# Patient Record
Sex: MALE | Race: ASIAN | NOT HISPANIC OR LATINO | Employment: OTHER | URBAN - METROPOLITAN AREA
[De-identification: names, ages, dates, MRNs, and addresses within clinical notes are randomized per-mention and may not be internally consistent; named-entity substitution may affect disease eponyms.]

---

## 2017-08-30 ENCOUNTER — OFFICE VISIT - HEALTHEAST (OUTPATIENT)
Dept: CARDIOLOGY | Facility: CLINIC | Age: 57
End: 2017-08-30

## 2017-08-30 DIAGNOSIS — I25.10 CAD (CORONARY ARTERY DISEASE): ICD-10-CM

## 2017-08-30 DIAGNOSIS — R06.00 DYSPNEA: ICD-10-CM

## 2017-08-30 DIAGNOSIS — R07.9 CHEST PAIN: ICD-10-CM

## 2017-08-30 ASSESSMENT — MIFFLIN-ST. JEOR: SCORE: 1617.32

## 2017-09-13 ENCOUNTER — COMMUNICATION - HEALTHEAST (OUTPATIENT)
Dept: CARDIOLOGY | Facility: CLINIC | Age: 57
End: 2017-09-13

## 2017-09-13 DIAGNOSIS — I25.10 CAD (CORONARY ARTERY DISEASE): ICD-10-CM

## 2017-09-13 DIAGNOSIS — R07.9 CHEST PAIN: ICD-10-CM

## 2017-09-14 ENCOUNTER — SURGERY - HEALTHEAST (OUTPATIENT)
Dept: CARDIOLOGY | Facility: CLINIC | Age: 57
End: 2017-09-14

## 2017-09-18 ENCOUNTER — AMBULATORY - HEALTHEAST (OUTPATIENT)
Dept: CARDIOLOGY | Facility: CLINIC | Age: 57
End: 2017-09-18

## 2017-09-21 ENCOUNTER — SURGERY - HEALTHEAST (OUTPATIENT)
Dept: CARDIOLOGY | Facility: CLINIC | Age: 57
End: 2017-09-21

## 2017-09-21 ASSESSMENT — MIFFLIN-ST. JEOR: SCORE: 1630.02

## 2017-09-22 ASSESSMENT — MIFFLIN-ST. JEOR: SCORE: 1609.16

## 2018-04-23 ENCOUNTER — COMMUNICATION - HEALTHEAST (OUTPATIENT)
Dept: UROLOGY | Facility: CLINIC | Age: 58
End: 2018-04-23

## 2018-04-25 ENCOUNTER — COMMUNICATION - HEALTHEAST (OUTPATIENT)
Dept: UROLOGY | Facility: CLINIC | Age: 58
End: 2018-04-25

## 2020-10-26 ENCOUNTER — OFFICE VISIT - HEALTHEAST (OUTPATIENT)
Dept: CARDIOLOGY | Facility: CLINIC | Age: 60
End: 2020-10-26

## 2020-10-26 DIAGNOSIS — R07.9 CHEST PAIN: ICD-10-CM

## 2020-10-26 DIAGNOSIS — I25.10 CAD (CORONARY ARTERY DISEASE): ICD-10-CM

## 2020-10-26 DIAGNOSIS — R06.00 DYSPNEA: ICD-10-CM

## 2020-10-26 RX ORDER — CARVEDILOL 6.25 MG/1
6.25 TABLET ORAL 2 TIMES DAILY WITH MEALS
Qty: 180 TABLET | Refills: 5 | Status: SHIPPED | OUTPATIENT
Start: 2020-10-26 | End: 2021-11-15

## 2020-10-26 ASSESSMENT — MIFFLIN-ST. JEOR: SCORE: 1612.79

## 2020-10-27 ENCOUNTER — RECORDS - HEALTHEAST (OUTPATIENT)
Dept: ADMINISTRATIVE | Facility: OTHER | Age: 60
End: 2020-10-27

## 2020-10-27 ENCOUNTER — AMBULATORY - HEALTHEAST (OUTPATIENT)
Dept: CARDIOLOGY | Facility: CLINIC | Age: 60
End: 2020-10-27

## 2020-10-27 ENCOUNTER — COMMUNICATION - HEALTHEAST (OUTPATIENT)
Dept: CARDIOLOGY | Facility: CLINIC | Age: 60
End: 2020-10-27

## 2020-11-03 ENCOUNTER — HOSPITAL ENCOUNTER (OUTPATIENT)
Dept: CARDIOLOGY | Facility: HOSPITAL | Age: 60
Discharge: HOME | End: 2020-11-03
Attending: INTERNAL MEDICINE

## 2020-11-03 DIAGNOSIS — R07.9 CHEST PAIN: ICD-10-CM

## 2020-11-03 DIAGNOSIS — I25.10 CAD (CORONARY ARTERY DISEASE): ICD-10-CM

## 2020-11-03 LAB
CV STRESS CURRENT BP HE: NORMAL
CV STRESS CURRENT HR HE: 104
CV STRESS CURRENT HR HE: 107
CV STRESS CURRENT HR HE: 113
CV STRESS CURRENT HR HE: 119
CV STRESS CURRENT HR HE: 122
CV STRESS CURRENT HR HE: 123
CV STRESS CURRENT HR HE: 123
CV STRESS CURRENT HR HE: 129
CV STRESS CURRENT HR HE: 130
CV STRESS CURRENT HR HE: 133
CV STRESS CURRENT HR HE: 133
CV STRESS CURRENT HR HE: 135
CV STRESS CURRENT HR HE: 138
CV STRESS CURRENT HR HE: 138
CV STRESS CURRENT HR HE: 143
CV STRESS CURRENT HR HE: 143
CV STRESS CURRENT HR HE: 72
CV STRESS CURRENT HR HE: 73
CV STRESS CURRENT HR HE: 77
CV STRESS CURRENT HR HE: 85
CV STRESS CURRENT HR HE: 90
CV STRESS CURRENT HR HE: 93
CV STRESS CURRENT HR HE: 93
CV STRESS CURRENT HR HE: 94
CV STRESS CURRENT HR HE: 95
CV STRESS CURRENT HR HE: 95
CV STRESS CURRENT HR HE: 97
CV STRESS DEVIATION TIME HE: NORMAL
CV STRESS ECHO PERCENT HR HE: NORMAL
CV STRESS EXERCISE STAGE HE: NORMAL
CV STRESS FINAL RESTING BP HE: NORMAL
CV STRESS FINAL RESTING HR HE: 95
CV STRESS MAX HR HE: 144
CV STRESS MAX TREADMILL GRADE HE: 16
CV STRESS MAX TREADMILL SPEED HE: 4.2
CV STRESS PEAK DIA BP HE: NORMAL
CV STRESS PEAK SYS BP HE: NORMAL
CV STRESS PHASE HE: NORMAL
CV STRESS PROTOCOL HE: NORMAL
CV STRESS RESTING PT POSITION HE: NORMAL
CV STRESS ST DEVIATION AMOUNT HE: NORMAL
CV STRESS ST DEVIATION ELEVATION HE: NORMAL
CV STRESS ST EVELATION AMOUNT HE: NORMAL
CV STRESS TEST TYPE HE: NORMAL
CV STRESS TOTAL STAGE TIME MIN 1 HE: NORMAL
RATE PRESSURE PRODUCT: NORMAL
STRESS ECHO BASELINE DIASTOLIC HE: 96
STRESS ECHO BASELINE HR: 72
STRESS ECHO BASELINE SYSTOLIC BP: 150
STRESS ECHO CALCULATED PERCENT HR: 89 %
STRESS ECHO LAST STRESS DIASTOLIC BP: 80
STRESS ECHO LAST STRESS HR: 143
STRESS ECHO LAST STRESS SYSTOLIC BP: 168
STRESS ECHO POST ESTIMATED WORKLOAD: 11.7
STRESS ECHO POST EXERCISE DUR MIN: 10
STRESS ECHO POST EXERCISE DUR SEC: 0
STRESS ECHO TARGET HR: 162

## 2020-11-08 ENCOUNTER — HOSPITAL ENCOUNTER (EMERGENCY)
Facility: CLINIC | Age: 60
Discharge: HOME | End: 2020-11-08
Attending: PHYSICIAN ASSISTANT | Admitting: PHYSICIAN ASSISTANT
Payer: COMMERCIAL

## 2020-11-08 VITALS
RESPIRATION RATE: 18 BRPM | WEIGHT: 192 LBS | SYSTOLIC BLOOD PRESSURE: 148 MMHG | OXYGEN SATURATION: 97 % | DIASTOLIC BLOOD PRESSURE: 92 MMHG | TEMPERATURE: 97.9 F | HEART RATE: 77 BPM

## 2020-11-08 DIAGNOSIS — S61.412A LACERATION OF LEFT HAND WITHOUT FOREIGN BODY, INITIAL ENCOUNTER: ICD-10-CM

## 2020-11-08 PROCEDURE — 99283 EMERGENCY DEPT VISIT LOW MDM: CPT | Performed by: PHYSICIAN ASSISTANT

## 2020-11-08 PROCEDURE — 12002 RPR S/N/AX/GEN/TRNK2.6-7.5CM: CPT | Performed by: PHYSICIAN ASSISTANT

## 2020-11-08 PROCEDURE — 99282 EMERGENCY DEPT VISIT SF MDM: CPT | Mod: 25 | Performed by: PHYSICIAN ASSISTANT

## 2020-11-08 ASSESSMENT — ENCOUNTER SYMPTOMS
WOUND: 1
COLOR CHANGE: 0
FEVER: 0
NUMBNESS: 0
WEAKNESS: 0
MUSCULOSKELETAL NEGATIVE: 1
CONSTITUTIONAL NEGATIVE: 1

## 2020-11-08 NOTE — ED NOTES
Pt was cutting venison with a newly sharpened knife across right lower palm of hand with approximate 5cm laceration.  Provider in room with pt.   Pt reports UTD with tetanus

## 2020-11-08 NOTE — ED AVS SNAPSHOT
[facility] Emergency Dept  [address] [address]  Phone: [phone]  Fax: [phone]                                    Rj Pink   MRN: [id number]    Department: [facility] Emergency Dept   Date of Visit: 11/8/2020           After Visit Summary Signature Page    I have received my discharge instructions, and my questions have been answered. I have discussed any challenges I see with this plan with the nurse or doctor.    ..........................................................................................................................................  Patient/Patient Representative Signature      ..........................................................................................................................................  Patient Representative Print Name and Relationship to Patient    ..................................................               ................................................  Date                                   Time    ..........................................................................................................................................  Reviewed by Signature/Title    ...................................................              ..............................................  Date                                               Time          22EPIC Rev 08/18

## 2020-11-09 NOTE — DISCHARGE INSTRUCTIONS
After care instructions:  Keep wound clean and dry for the next 24-48 hours  Sutures out in 7-9 days  Signs of infection discussed today  Apply anti-bacterial ointment for 3 days  May return to work as long as wound is kept clean and dry  Discussed the probability of scarring  9 sutures place       Return to clinic sooner or go to Emergency Room if symptoms worsen or change or signs of infection occur (redness, red streaking, warmth, increased pain, increased swelling, purulent drainage, or fevers occur.)    May use acetaminophen, ibuprofen as needed    Patient voiced understanding of instructions given.

## 2020-11-09 NOTE — ED PROVIDER NOTES
History     Chief Complaint   Patient presents with     Laceration     laceration to left hand with knife.~3' lac bldg controlled with pressure     HPI    Rj Pink is a 60 year old male who presents to the clinic with a laceration on the left hand sustained 2 hours ago.  This is a non-work related and accidental injury.  Mechanism of injury: knife. Patient states he was cutting meat for a meal and cut the hand with the tip of the knife.     Associated symptoms: Denies numbness, weakness, or loss of function  Last tetanus booster within 5 years: yes; 2017    Patient takes a baby aspirin daily    Problem list, Medication list, Allergies, and Medical/Social/Surgical histories reviewed in [employer] and updated as appropriate.    Allergies:  No Known Allergies    Problem List:    There are no active problems to display for this patient.       Past Medical History:    No past medical history on file.    Past Surgical History:    No past surgical history on file.    Family History:    No family history on file.    Social History:  Marital Status:   [2]  Social History     Tobacco Use     Smoking status: Not on file   Substance Use Topics     Alcohol use: Not on file     Drug use: Not on file        Medications:    No current outpatient medications on file.        Review of Systems   Constitutional: Negative.  Negative for fever.   Musculoskeletal: Negative.    Skin: Positive for wound (laceration to the left hand over the thenar eminence. ). Negative for color change.   Neurological: Negative for weakness and numbness.   All other systems reviewed and are negative.      Physical Exam   BP: (!) 148/92  Pulse: 77  Temp: 97.9  F (36.6  C)  Resp: 18  Weight: 87.1 kg (192 lb)  SpO2: 97 %      Physical Exam  Vitals signs and nursing note reviewed.   Constitutional:       General: He is not in acute distress.     Appearance: Normal appearance. He is normal weight. He is not ill-appearing or toxic-appearing.    Cardiovascular:      Pulses: Normal pulses.   Musculoskeletal:      Left hand: He exhibits tenderness and laceration. He exhibits normal range of motion, no bony tenderness, normal two-point discrimination, normal capillary refill and no swelling. Normal sensation noted. Normal strength noted.        Hands:       Comments: Patient has full range of motion in the hand with and without resistance in all fingers.  Patient neurovascularly intact with no concerns for vascular injury, tendon injury or bony involvement.   Skin:     General: Skin is warm.      Capillary Refill: Capillary refill takes less than 2 seconds.      Findings: Laceration (4 cm superifical laceration to left hand across thenar eminence. ) present.   Neurological:      General: No focal deficit present.      Mental Status: He is alert and oriented to person, place, and time.   Psychiatric:         Mood and Affect: Mood normal.         Behavior: Behavior normal.         Thought Content: Thought content normal.         Judgment: Judgment normal.         ED Course        [facility]     -Laceration Repair    Date/Time: 11/8/2020 7:48 PM  Performed by: Barbara Roche PA-C  Authorized by: Barbara Roche PA-C       ANESTHESIA (see MAR for exact dosages):     Anesthesia method:  Local infiltration    Local anesthetic:  Bupivacaine 0.25% w/o epi  LACERATION DETAILS     Location:  Hand    Hand location:  L palm    Length (cm):  4    Depth (mm):  2    REPAIR TYPE:     Repair type:  Simple      EXPLORATION:     Wound exploration: wound explored through full range of motion and entire depth of wound probed and visualized      Wound extent: no foreign body, no nerve damage, no tendon damage, no underlying fracture and no vascular damage      Contaminated: no      TREATMENT:     Area cleansed with:  Hibiclens and saline    Amount of cleaning:  Extensive    Irrigation method:  Syringe    SKIN REPAIR     Repair method:   Sutures    Suture size:  5-0    Suture material:  Nylon    Number of sutures:  9    APPROXIMATION     Approximation:  Close    POST-PROCEDURE DETAILS     Dressing:  Antibiotic ointment, sterile dressing and bulky dressing      PROCEDURE   Patient Tolerance:  Patient tolerated the procedure well with no immediate complications                    Critical Care time:  none               No results found for this or any previous visit (from the past 24 hour(s)).    Medications - No data to display    Assessments & Plan (with Medical Decision Making)     I have reviewed the nursing notes.    I have reviewed the findings, diagnosis, plan and need for follow up with the patient.   60-year-old male presents today with laceration to the left hand that occurred while using a kitchen knife.  Patient's last tetanus was in 2017 and is up-to-date.  Patient denies any numbness, tingling, pain in proportion or foreign body.  See exam findings above.  9 sutures placed in the 4 cm superficial laceration to the left thenar eminence.  Patient has full range of motion in the hand with and without resistance and neurovascularly intact.  Bleeding well controlled and stop with suture placements.  No vascular, tendon or bony involvement noted.  Patient's wound was bandaged and patient informed to have sutures removed in the next 7 to 9 days.  Patient return sooner if signs or symptoms of infection occur.  This was discussed with patient and given on discharge paperwork.  Patient discharged in stable condition with no indication for antibiotics at this point.  Patient keep wound clean and covered.  Bacitracin for 3 days and then dry bandage.    There are no discharge medications for this patient.      Final diagnoses:   Laceration of left hand without foreign body, initial encounter       11/8/2020   [facility] EMERGENCY DEPT     Barbara Roche PA-C  11/08/20 [id number]

## 2021-03-10 ENCOUNTER — COMMUNICATION - HEALTHEAST (OUTPATIENT)
Dept: CARDIOLOGY | Facility: CLINIC | Age: 61
End: 2021-03-10

## 2021-05-27 ENCOUNTER — RECORDS - HEALTHEAST (OUTPATIENT)
Dept: ADMINISTRATIVE | Facility: CLINIC | Age: 61
End: 2021-05-27

## 2021-05-31 VITALS — WEIGHT: 191.2 LBS | HEIGHT: 65 IN | BODY MASS INDEX: 31.86 KG/M2

## 2021-05-31 VITALS — WEIGHT: 193 LBS | HEIGHT: 65 IN | BODY MASS INDEX: 32.15 KG/M2

## 2021-06-05 ENCOUNTER — RECORDS - HEALTHEAST (OUTPATIENT)
Dept: CARDIOLOGY | Facility: CLINIC | Age: 61
End: 2021-06-05

## 2021-06-05 VITALS
WEIGHT: 192 LBS | BODY MASS INDEX: 31.99 KG/M2 | DIASTOLIC BLOOD PRESSURE: 80 MMHG | RESPIRATION RATE: 14 BRPM | HEART RATE: 80 BPM | HEIGHT: 65 IN | SYSTOLIC BLOOD PRESSURE: 130 MMHG

## 2021-06-05 DIAGNOSIS — I25.10 CORONARY ATHEROSCLEROSIS: ICD-10-CM

## 2021-06-12 NOTE — PATIENT INSTRUCTIONS - HE
Please obtain blood test results from [place] and have copies sent to us    Schedule stress echo    Cont clopidogrel and asp 81mg until results back on stress echo - if normal, then stop clopidogrel and raise aspirin to 162mg daily.      Stop metoprolol and start carvedilol - watch for rash, shortness of breath

## 2021-06-12 NOTE — PROGRESS NOTES
[facility] Heart Care Note    Assessment/Plan:    Rj Pink is a 57 y.o. old male with:  1. Chest pain/dyspnea - features atypical for angina for chest pain but dyspnea could be due to CAD - although lower likelyhood given stress echo  - plan to lower asp 81mg daily and change metoprolol tartrate to succinate at night 50mg, he will discuss with his family (son is physician) and if he would want to pursue angiography would go ahead with favoring FFR prior to PCI given features noted above.  Cont plavix/atorvastatin/losartan        Dr. Willard Ramirez  [facility] HEART CARE  [phone]  ______________________________________________________________________    Subjective:    I had the opportunity to see Rj Pink at the [facility]. Rj Pink is a 57 y.o. male with a known history of CAD s/p MI and then PCI to LAD year later in 2015 returns with chest pain prompting stress echo - no ischemia ECG/echo after 10 min, noted EF 50% with no WMA>  Chest pain not with exertion or with stress test, but intermittently presnt, noted residual mod dz inCirc territory.  Pt does report dyspnea presnt on walking up stairs more than in the past - he is IDDM and we discussed risks, benefits, goals and alternatives to revasularization in setting of symptoms or travel or if he were to require high risk surgery.    ______________________________________________________________________      Review of Systems:   General: Weight Gain  Eyes: WNL  Ears/Nose/Throat: WNL  Lungs: Cough, Wheezing  Heart: Chest Pain, Shortness of Breath with activity  Stomach: Heartburn  Bladder: WNL  Muscle/Joints: Muscle Weakness  Skin: WNL  Nervous System: WNL  Mental Health: WNL     Blood: WNL    Problem List:  Patient Active Problem List   Diagnosis     Obesity     Epicondylitis     Male Erectile Disorder     Mixed hyperlipidemia     Joint Pain, Localized In The Shoulder     Diabetes mellitus type II, controlled     Essential  "hypertension     Non-STEMI (non-ST elevated myocardial infarction)     Coronary artery disease     Chest pain     Medical History:  Past Medical History:   Diagnosis Date     Chest pain      Coronary artery disease 11/13/2015     Diabetes mellitus, type II      Hypertension      Kidney stone      Myocardial infarction     NSTEMI 2014     Surgical History:  Past Surgical History:   Procedure Laterality Date     CARDIAC CATHETERIZATION  10/27/15     CORONARY STENT PLACEMENT  2014     kidney stone removal       Social History:  No pertinent social hx related to patient's chief complaint other than above in subjective        Family History:  No pertinent family hx related to patient's chief complaint other than above in subjective      Allergies:  Allergies   Allergen Reactions     Lisinopril Cough       Medications:  Current Outpatient Prescriptions   Medication Sig Dispense Refill     aspirin 325 MG EC tablet Take 325 mg by mouth daily.       atorvastatin (LIPITOR) 80 MG tablet Take 80 mg by mouth every evening.        clopidogrel (PLAVIX) 75 mg tablet Take 75 mg by mouth daily.       insulin glargine (LANTUS) 100 unit/mL injection Inject 40 Units under the skin at bedtime.        losartan (COZAAR) 50 MG tablet Take 50 mg by mouth once daily.        metFORMIN (GLUCOPHAGE-XR) 500 MG 24 hr tablet Take 1,000 mg by mouth 2 (two) times a day with meals.       metoprolol tartrate (LOPRESSOR) 50 MG tablet Take 50 mg by mouth daily.       nitroglycerin (NITROSTAT) 0.4 MG SL tablet Place 1 tablet (0.4 mg total) under the tongue every 5 (five) minutes as needed. Up to 3 doses for chest pain 90 tablet 11     No current facility-administered medications for this visit.        Objective:   Vital signs:  /78 (Patient Site: Left Arm, Patient Position: Sitting, Cuff Size: Adult Large)  Pulse 76  Resp 18  Ht 5' 5\" (1.651 m)  Wt 193 lb (87.5 kg)  BMI 32.12 kg/m2      Physical Exam:    GENERAL APPEARANCE: Alert, cooperative " and in no acute distress.  HEENT: No scleral icterus. No Xanthelasma. Oral mucuos membranes pink and moist.  NECK: JVP<6cm. No Hepatojugular reflux. Thyroid not visualized  CHEST: clear to auscultation  CARDIOVASCULAR: S1, S2 without murmur ,clicks or rubs. Brachial, radial and posterior tibial pulses are intact and symetric. No carotid bruits noted.    ABDOMEN: Nontender. BS+. No bruits.  EXTREMITIES: No cyanosis, clubbing or edema.    Lab Results:  LIPIDS:  Lab Results   Component Value Date    CHOL 161 07/04/2014    CHOL 170 08/31/2012    CHOL 176 04/25/2011     Lab Results   Component Value Date    HDL 27 (L) 07/04/2014    HDL 23 (L) 08/31/2012    HDL 25 (L) 04/25/2011     Lab Results   Component Value Date    LDLCALC 78 07/04/2014    LDLCALC   Invalid, Triglyceride >300 08/31/2012    LDLCALC   Invalid, Triglyceride >300 04/25/2011     Lab Results   Component Value Date    TRIG 279 (H) 07/04/2014    TRIG 637 (H) 08/31/2012    TRIG 331 (H) 04/25/2011     No components found for: CHOLHDL    BMP:  Lab Results   Component Value Date    CREATININE 1.02 06/30/2017    BUN 17 06/30/2017     06/30/2017    K 4.0 06/30/2017     (H) 06/30/2017    CO2 22 06/30/2017         Willard Ramirez MD  [facility]  [phone]

## 2021-06-12 NOTE — TELEPHONE ENCOUNTER
----- Message from Willard Ramirez MD sent at 10/27/2020  1:01 PM CDT -----  Lipids at target cont with atorvastatin        Called patient and reviewed response from Dr. Ramirez. Patient verbalized understanding and agreement. No further questions concerns. -ejb

## 2021-06-13 NOTE — PROGRESS NOTES
Rj Alegriag  [address] [facility] [address]  [phone] (home)     Primary cardiologist: Dr. Ramirez  Procedure:  Coronary Angiogram Possible PCI  H&P completed by:  Dr. Ashley Almodovar MD: Dr. Gaspar  Admit date and time:  9/21 8 am  Case start time:  10 am  Ordering MD:  Dr. Ramirez  Diagnosis:  Chest pain  Anticoagulation: None  CPAP: No  Bypass Grafts: No  Renal Issues: No  Allergies:   Allergies   Allergen Reactions     Lisinopril Cough     Diabetic?: Yes  Device?: No      Angiogram Teaching    Reason for Visit:  Telephone call to discuss pre-procedure education in preparation for: Coronary Angiogram Possible PCI    Procedure Prep:  Cardiologist note dated: 8/30  EKG results obtained, dated: am of procedure  Hemogram results obtained: am of procedure  Basic Metabolic Panel results obtained: am of procedure  Lipid Profile results obtained: am of procedure    Patient Education  Explained indications/risks for diagnostic evaluation, including one or more of the following:  coronary angiogram, less than 0.1% of acute myocardial infarction and CVA or death or any of the following to the degree that it could threaten life:  allergic reaction, arrhythmia, renal failure, hemorrhage, thrombosis and infection  Explained indications/risks for therapeutic interventions, including one or more of the following: PTCA, artherectomy, stent, 2% or less risk of MI, less than 1% risk of CVA, emergency heart surgery, death, less than 4 % risk of vascular injury requiring surgical repair or blood transfusion, 20-30% risk of restonosis with a bare metal stent, less than 10% risk of restonosis with a drug-eluting stent, 0.5-1% chance of stent thrombosis and may need clopidogrel (Plavix) form greater than 1 year.  These risks are in addition to baseline risks associated with a Diagnostic Evaluation.  Patient states understanding of procedure and risks and agrees to proceed.    Pre-procedure instructions  Patient instructed to  be NPO after midnight.  Patient instructed to arrange for transportation home following procedure.  Patient instructed to have a responsible adult with them for 24 hours post-procedure.  Post-procedure follow up process.  Conscious sedation discussed.    Pre-procedure medication instructions  Patient instructed on antiplatelet medication.  Continue medications as scheduled, with a small amount of water on the day of the procedure unless indicated.  Patient instructed to take 325 mg of Aspirin am of procedure: No  Other medication: instructed to hold Metformin a.m. of the procedure. Instructed to hold Metformin 48 hours after procedure  Pt takes Lantus at HS (no am dose)  *Order set was entered on this date: 9/14      Patient Active Problem List   Diagnosis     Obesity     Epicondylitis     Male Erectile Disorder     Mixed hyperlipidemia     Joint Pain, Localized In The Shoulder     Diabetes mellitus type II, controlled     Essential hypertension     Non-STEMI (non-ST elevated myocardial infarction)     Coronary artery disease     Chest pain     Chest pain     CAD (coronary artery disease)       Current Outpatient Prescriptions   Medication Sig Dispense Refill     aspirin 81 MG EC tablet Take 1 tablet (81 mg total) by mouth daily.  0     atorvastatin (LIPITOR) 80 MG tablet Take 80 mg by mouth every evening.        clopidogrel (PLAVIX) 75 mg tablet Take 75 mg by mouth daily.       insulin glargine (LANTUS) 100 unit/mL injection Inject 40 Units under the skin at bedtime.        losartan (COZAAR) 50 MG tablet Take 50 mg by mouth once daily.        metFORMIN (GLUCOPHAGE-XR) 500 MG 24 hr tablet Take 1,000 mg by mouth 2 (two) times a day with meals.       metoprolol succinate (TOPROL XL) 50 MG 24 hr tablet Take 1 tablet (50 mg total) by mouth daily. 90 tablet 5     nitroglycerin (NITROSTAT) 0.4 MG SL tablet Place 1 tablet (0.4 mg total) under the tongue every 5 (five) minutes as needed. Up to 3 doses for chest pain 90  tablet 11     No current facility-administered medications for this visit.        Allergies   Allergen Reactions     Lisinopril Cough       Patient verbalized understanding of the above teaching.    Kip Rowe RN, BSN, PHN  Cardiovascular Nurse Clinician  [facility]  Direct: [phone]  [phone]  Email: [email or web address]

## 2021-06-15 NOTE — TELEPHONE ENCOUNTER
----- Message from Dodie Castillo sent at 3/10/2021 10:42 AM CST -----  General phone call:  PATIENT WONDERING IF HE CAN GET A VACCINE DUE TO HIS MEDICAL CONDITION?  PLEASE CALL    Caller: PATIENT  Primary cardiologist: DR TEJEDA  Detailed reason for call: SEE ABOVE  New or active symptoms? NO  Best phone number: [phone]  Best time to contact: ANY TIME  Ok to leave a detailed message? YES  Device? NO    Additional Info:

## 2021-06-15 NOTE — TELEPHONE ENCOUNTER
Informed pt he may get the vaccine.  Pt verbalized understanding and had no further questions.  -jamir

## 2021-06-16 PROBLEM — T88.59XA NAUSEA AFTER ANESTHESIA: Status: ACTIVE | Noted: 2017-09-21

## 2021-06-16 PROBLEM — I20.9 ANGINAL PAIN (H): Status: ACTIVE | Noted: 2017-06-29

## 2021-06-16 PROBLEM — I25.10 CORONARY ARTERY DISEASE INVOLVING NATIVE CORONARY ARTERY OF NATIVE HEART, ANGINA PRESENCE UNSPECIFIED: Status: ACTIVE | Noted: 2017-09-13

## 2021-06-16 PROBLEM — R11.0 NAUSEA AFTER ANESTHESIA: Status: ACTIVE | Noted: 2017-09-21

## 2021-06-16 PROBLEM — R07.9 CHEST PAIN: Status: ACTIVE | Noted: 2017-09-13

## 2021-06-29 NOTE — PROGRESS NOTES
Progress Notes by Willard Ramirez MD at 10/26/2020  7:50 AM     Author: Willard Ramirez MD Service: -- Author Type: Physician    Filed: 10/26/2020  8:29 AM Encounter Date: 10/26/2020 Status: Signed    : Willard Ramirez MD (Physician)         Thank you, Dr. Gong, for asking the [facility] team to see Mr. Rj Pink to evaluate       Assessment/Recommendations   Assessment/Plan:  1. CAD s/p PCI - on atorvastatin 80mg, will plan stopping clopidogrel and raise asp 162mg daily  IF stress echo is normal given symptoms of chest pressure.  Change metoprolol to carvedilol given DM and possible spasm  2. HTN - on losartan, hydrochlorothiazide and now carvedilol  3. Lipids - have tests done by primary care sent to us for review    Follow up in 1 year         History of Present Illness/Subjective    Mr. Rj Pink is a 60 y.o. male with hx for DM, CKD, CAD s/p PCI to LAD in 2015, last angiography 2017 with mod dz in OM by [facility], describes two types of pain - one on chest that occurs randomly not with exertion and a pain in the right upper back/shoulder blade also random not with movement/exertion, but also dyspnea when walk up several flights of stairs. No tob, he has DM with last HgbA1c 7.7 with [facility].  No PND, orthopnea, no ALYSSA, no GI/ bleeding, tolerating atorvastatin 80mg.   He traveled to [place] in 2017 for several weeks.   He took NTG 2 weeks ago from dull pain spontaneously, relief after 2-3 minutes.         Physical Examination Review of Systems   Vitals:    10/26/20 0759   BP: 130/80   Pulse: 80   Resp: 14     Body mass index is 31.95 kg/m .  Wt Readings from Last 3 Encounters:   10/26/20 192 lb (87.1 kg)   04/22/18 190 lb (86.2 kg)   09/22/17 191 lb 3.2 oz (86.7 kg)     [unfilled]  General Appearance:   no distress, normal body habitus   ENT/Mouth: membranes moist, no oral lesions or bleeding gums.      EYES:  no scleral icterus, normal  conjunctivae   Neck: no carotid bruits or thyromegaly   Chest/Lungs:   lungs are clear to auscultation, no rales or wheezing,  sternal scar, equal chest wall expansion    Cardiovascular:   Regular. Normal first and second heart sounds with no murmurs, rubs, or gallops; the carotid, radial and posterior tibial pulses are intact, Jugular venous pressure , edema bilaterally    Abdomen:  no organomegaly, masses, bruits, or tenderness; bowel sounds are present   Extremities: no cyanosis or clubbing   Skin: no xanthelasma, warm.    Neurologic: normal  bilateral, no tremors     Psychiatric: alert and oriented x3, calm     Review of Systems - 12 points nega other than above      Medical History  Surgical History Family History Social History   Past Medical History:   Diagnosis Date   ? Chest pain    ? Coronary artery disease 11/13/2015   ? Diabetes mellitus, type II (H)    ? Hypertension    ? Kidney stone    ? Myocardial infarction (H)     NSTEMI 2014    Past Surgical History:   Procedure Laterality Date   ? CARDIAC CATHETERIZATION  10/27/15   ? CORONARY STENT PLACEMENT  2014   ? CV CORONARY ANGIOGRAM N/A 9/21/2017    Procedure: Coronary Angiogram;  Surgeon: Claribel Gaspar MD;  Location: [facility];  Service:    ? CV LEFT HEART CATHETERIZATION WITH LEFT VENTRICULOGRAM N/A 9/21/2017    Procedure: Left Heart Catheterization with Left Ventriculogram;  Surgeon: Claribel Gaspar MD;  Location: [facility];  Service:    ? kidney stone removal      Family History   Problem Relation Age of Onset   ? Stroke Brother 46   ? Heart attack Neg Hx     Social History     Socioeconomic History   ? Marital status:      Spouse name: Not on file   ? Number of children: Not on file   ? Years of education: Not on file   ? Highest education level: Not on file   Occupational History   ? Not on file   Social Needs   ? Financial resource strain: Not on file   ? Food insecurity     Worry: Not on file     Inability: Not  on file   ? Transportation needs     Medical: Not on file     Non-medical: Not on file   Tobacco Use   ? Smoking status: Never Smoker   ? Smokeless tobacco: Never Used   Substance and Sexual Activity   ? Alcohol use: Yes     Comment: once in a few months   ? Drug use: No   ? Sexual activity: Not on file   Lifestyle   ? Physical activity     Days per week: Not on file     Minutes per session: Not on file   ? Stress: Not on file   Relationships   ? Social connections     Talks on phone: Not on file     Gets together: Not on file     Attends Jainism service: Not on file     Active member of club or organization: Not on file     Attends meetings of clubs or organizations: Not on file     Relationship status: Not on file   ? Intimate partner violence     Fear of current or ex partner: Not on file     Emotionally abused: Not on file     Physically abused: Not on file     Forced sexual activity: Not on file   Other Topics Concern   ? Not on file   Social History Narrative   ? Not on file          Medications  Allergies   Scheduled Meds:  Continuous Infusions:  PRN Meds:. Allergies   Allergen Reactions   ? Lisinopril Cough         Lab Results    Chemistry/lipid CBC Cardiac Enzymes/BNP/TSH/INR   Lab Results   Component Value Date    CHOL 113 09/21/2017    HDL 23 (L) 09/21/2017    LDLCALC 36 09/21/2017    TRIG 271 (H) 09/21/2017    CREATININE 1.30 04/22/2018    BUN 23 (H) 04/22/2018    K 3.7 04/22/2018     04/22/2018     04/22/2018    CO2 28 04/22/2018    Lab Results   Component Value Date    WBC 9.9 04/22/2018    HGB 14.1 04/22/2018    HCT 41.1 04/22/2018    MCV 85 04/22/2018     04/22/2018    Lab Results   Component Value Date    TROPONINI <0.01 06/29/2017    TSH 1.7 12/07/2012    INR 1.02 06/29/2017              Willard Ramirez MD  Interventional Cardiology  [facility]

## 2022-02-06 DIAGNOSIS — I25.10 CAD (CORONARY ARTERY DISEASE): ICD-10-CM

## 2022-02-06 DIAGNOSIS — R07.9 CHEST PAIN: ICD-10-CM

## 2022-02-07 RX ORDER — CARVEDILOL 6.25 MG/1
TABLET ORAL
Qty: 180 TABLET | Refills: 0 | Status: SHIPPED | OUTPATIENT
Start: 2022-02-07 | End: 2022-07-28

## 2022-07-28 ENCOUNTER — OFFICE VISIT (OUTPATIENT)
Dept: CARDIOLOGY | Facility: CLINIC | Age: 62
End: 2022-07-28
Payer: COMMERCIAL

## 2022-07-28 VITALS
BODY MASS INDEX: 30.95 KG/M2 | DIASTOLIC BLOOD PRESSURE: 84 MMHG | SYSTOLIC BLOOD PRESSURE: 120 MMHG | HEART RATE: 102 BPM | WEIGHT: 186 LBS | RESPIRATION RATE: 16 BRPM

## 2022-07-28 DIAGNOSIS — E78.5 HYPERLIPIDEMIA LDL GOAL <70: ICD-10-CM

## 2022-07-28 DIAGNOSIS — I15.9 SECONDARY HYPERTENSION: ICD-10-CM

## 2022-07-28 DIAGNOSIS — R07.9 CHEST PAIN, UNSPECIFIED TYPE: Primary | ICD-10-CM

## 2022-07-28 LAB
ALBUMIN SERPL-MCNC: 4 G/DL (ref 3.5–5)
ALP SERPL-CCNC: 69 U/L (ref 45–120)
ALT SERPL W P-5'-P-CCNC: 19 U/L (ref 0–45)
ANION GAP SERPL CALCULATED.3IONS-SCNC: 8 MMOL/L (ref 5–18)
AST SERPL W P-5'-P-CCNC: 12 U/L (ref 0–40)
BILIRUB SERPL-MCNC: 0.7 MG/DL (ref 0–1)
BUN SERPL-MCNC: 25 MG/DL (ref 8–22)
CALCIUM SERPL-MCNC: 10.1 MG/DL (ref 8.5–10.5)
CHLORIDE BLD-SCNC: 106 MMOL/L (ref 98–107)
CHOLEST SERPL-MCNC: 104 MG/DL
CO2 SERPL-SCNC: 26 MMOL/L (ref 22–31)
CREAT SERPL-MCNC: 1.33 MG/DL (ref 0.7–1.3)
D DIMER PPP FEU-MCNC: <0.27 UG/ML FEU (ref 0–0.5)
ERYTHROCYTE [DISTWIDTH] IN BLOOD BY AUTOMATED COUNT: 13.1 % (ref 10–15)
FASTING STATUS PATIENT QL REPORTED: YES
GFR SERPL CREATININE-BSD FRML MDRD: 61 ML/MIN/1.73M2
GLUCOSE BLD-MCNC: 83 MG/DL (ref 70–125)
HCT VFR BLD AUTO: 48.1 % (ref 40–53)
HDLC SERPL-MCNC: 24 MG/DL
HGB BLD-MCNC: 15.3 G/DL (ref 13.3–17.7)
LDLC SERPL CALC-MCNC: 47 MG/DL
MCH RBC QN AUTO: 28 PG (ref 26.5–33)
MCHC RBC AUTO-ENTMCNC: 31.8 G/DL (ref 31.5–36.5)
MCV RBC AUTO: 88 FL (ref 78–100)
PLATELET # BLD AUTO: 190 10E3/UL (ref 150–450)
POTASSIUM BLD-SCNC: 4.8 MMOL/L (ref 3.5–5)
PROT SERPL-MCNC: 7.9 G/DL (ref 6–8)
RBC # BLD AUTO: 5.47 10E6/UL (ref 4.4–5.9)
SODIUM SERPL-SCNC: 140 MMOL/L (ref 136–145)
TRIGL SERPL-MCNC: 166 MG/DL
TROPONIN I SERPL-MCNC: <0.01 NG/ML (ref 0–0.29)
WBC # BLD AUTO: 8.6 10E3/UL (ref 4–11)

## 2022-07-28 PROCEDURE — 85379 FIBRIN DEGRADATION QUANT: CPT | Performed by: INTERNAL MEDICINE

## 2022-07-28 PROCEDURE — 80061 LIPID PANEL: CPT | Performed by: INTERNAL MEDICINE

## 2022-07-28 PROCEDURE — 36415 COLL VENOUS BLD VENIPUNCTURE: CPT | Performed by: INTERNAL MEDICINE

## 2022-07-28 PROCEDURE — 93000 ELECTROCARDIOGRAM COMPLETE: CPT | Performed by: INTERNAL MEDICINE

## 2022-07-28 PROCEDURE — 80053 COMPREHEN METABOLIC PANEL: CPT | Performed by: INTERNAL MEDICINE

## 2022-07-28 PROCEDURE — 84484 ASSAY OF TROPONIN QUANT: CPT | Performed by: INTERNAL MEDICINE

## 2022-07-28 PROCEDURE — 85027 COMPLETE CBC AUTOMATED: CPT | Performed by: INTERNAL MEDICINE

## 2022-07-28 PROCEDURE — 99214 OFFICE O/P EST MOD 30 MIN: CPT | Performed by: INTERNAL MEDICINE

## 2022-07-28 RX ORDER — EMPAGLIFLOZIN 10 MG/1
10 TABLET, FILM COATED ORAL DAILY
COMMUNITY
Start: 2022-07-18 | End: 2024-03-26

## 2022-07-28 RX ORDER — ATORVASTATIN CALCIUM 80 MG/1
80 TABLET, FILM COATED ORAL EVERY EVENING
Qty: 90 TABLET | Refills: 11 | Status: SHIPPED | OUTPATIENT
Start: 2022-07-28 | End: 2024-03-26

## 2022-07-28 RX ORDER — LOSARTAN POTASSIUM 50 MG/1
50 TABLET ORAL DAILY
Qty: 90 TABLET | Refills: 11 | Status: SHIPPED | OUTPATIENT
Start: 2022-07-28 | End: 2024-03-26

## 2022-07-28 RX ORDER — METOPROLOL SUCCINATE 50 MG/1
50 TABLET, EXTENDED RELEASE ORAL AT BEDTIME
Qty: 90 TABLET | Refills: 11 | Status: SHIPPED | OUTPATIENT
Start: 2022-07-28 | End: 2024-03-26

## 2022-07-28 NOTE — PROGRESS NOTES
Thank you, Dr. Ramirez, for asking the [facility] Heart Care team to see Mr. Rj Pink to evaluate       Assessment/Recommendations   Assessment/Plan:  1. Chest pain - symptoms atypical for angina but with DM and dyspnea on exertion with the mask and tachycardia (could be related to anxiety from coming to physician's office), will start with troponin, d-dimer (air travel in early July), CMP, CBC.  If above normal, plan stress echo. ECG today with no change compared to 2017.    2. CAD s/p PCI - on asp 162mg, on atorvastatin, noted concern for spasm in the past but with headache and dyspnea since change metoprolol to carvedilol, will change back.    3. CV prevention - lipids today (granted non fasting) but will check LDL goal <70  4. HTN - on losartan and now metoprolol  5. Dyspnea on exertion  - with mask only, as above - in addition, may be HFpEF, on jardiance, will track above response.      Follow up 12 mo or sooner pendnig results     History of Present Illness/Subjective    Mr. Rj Pink is a 62 year old male with NIDDM, CAD s/pPCI, symptoms of dyspnea, diaphoresis and sharp pain rsolved post PCI to LAD, sometimes he has at times intermittently chest pain/sharp right now 2:10, and a little more dyspnea with a mask on exertion but without mask feels fine.  He has to go slow up the stairs but without the mask feels fine. No GI/ bleeding, no PND/orthonpea, syncopal events, some dizzy spells.  In the AM has headache and on and off during the day last 7-8 mo.   Blood glucose 110 to 120.  No hx of asthma or tob use.  Has checked BP/HR but does not recall.      Meds  Losartan 50mg  Carvedilol 6.25mg   Atorvastatin 80mg       Physical Examination Review of Systems   /84 (BP Location: Right arm, Patient Position: Sitting, Cuff Size: Adult Regular)   Pulse 102   Resp 16   Wt 84.4 kg (186 lb)   BMI 30.95 kg/m    Body mass index is 30.95 kg/m .  Wt Readings from Last 3 Encounters:   07/28/22 84.4 kg  (186 lb)   11/08/20 87.1 kg (192 lb)   10/26/20 87.1 kg (192 lb)     [unfilled]  General Appearance:   no distress, normal body habitus   ENT/Mouth: membranes moist, no oral lesions or bleeding gums.      EYES:  no scleral icterus, normal conjunctivae   Neck: no carotid bruits or thyromegaly   Chest/Lungs:   lungs are clear to auscultation, no rales or wheezing,  sternal scar, equal chest wall expansion    Cardiovascular:   Regular. Normal first and second heart sounds with no murmurs, rubs, or gallops; the carotid, radial and posterior tibial pulses are intact, Jugular venous pressure , edema bilaterally    Abdomen:  no organomegaly, masses, bruits, or tenderness; bowel sounds are present   Extremities: no cyanosis or clubbing   Skin: no xanthelasma, warm.    Neurologic: normal  bilateral, no tremors     Psychiatric: alert and oriented x3, calm     Review of Systems - 12 points nega other than above      Medical History  Surgical History Family History Social History   Past Medical History:   Diagnosis Date     Chest pain      Coronary artery disease 11/13/2015     Diabetes mellitus, type II (H)      Hypertension      Kidney stone      Myocardial infarction (H)     NSTEMI 2014    Past Surgical History:   Procedure Laterality Date     CARDIAC CATHETERIZATION  10/27/15     CORONARY STENT PLACEMENT  2014     CV CORONARY ANGIOGRAM N/A 9/21/2017    Procedure: Coronary Angiogram;  Surgeon: Claribel Gaspar MD;  Location: [facility] Lab;  Service:      CV LEFT HEART CATHETERIZATION WITH LEFT VENTRICULOGRAM N/A 9/21/2017    Procedure: Left Heart Catheterization with Left Ventriculogram;  Surgeon: Claribel Gaspar MD;  Location: [facility] Lab;  Service:      OTHER SURGICAL HISTORY      kidney stone removal    Family History   Problem Relation Age of Onset     Cerebrovascular Disease Brother 46.00     Coronary Artery Disease No family hx of     Social History     Socioeconomic History     Marital status:       Spouse name: Not on file     Number of children: Not on file     Years of education: Not on file     Highest education level: Not on file   Occupational History     Not on file   Tobacco Use     Smoking status: Never Smoker     Smokeless tobacco: Never Used   Substance and Sexual Activity     Alcohol use: Yes     Comment: Alcoholic Drinks/day: once in a few months     Drug use: No     Sexual activity: Not on file   Other Topics Concern     Not on file   Social History Narrative     Not on file     Social Determinants of Health     Financial Resource Strain: Not on file   Food Insecurity: Not on file   Transportation Needs: Not on file   Physical Activity: Not on file   Stress: Not on file   Social Connections: Not on file   Intimate Partner Violence: Not on file   Housing Stability: Not on file          Medications  Allergies   Scheduled Meds:  Continuous Infusions:  PRN Meds:. Allergies   Allergen Reactions     Lisinopril Cough         Lab Results    Chemistry/lipid CBC Cardiac Enzymes/BNP/TSH/INR   Lab Results   Component Value Date    CHOL 113 09/21/2017    HDL 23 (L) 09/21/2017    TRIG 271 (H) 09/21/2017    BUN 23 (H) 04/22/2018     04/22/2018    CO2 28 04/22/2018    Lab Results   Component Value Date    WBC 9.9 04/22/2018    HGB 14.1 04/22/2018    HCT 41.1 04/22/2018    MCV 85 04/22/2018     04/22/2018    No results found for: CKTOTAL, CKMB, TROPONINI, BNP, TSH, INR           Willard Ramirez MD  Interventional Cardiology  [facility]

## 2022-07-28 NOTE — LETTER
7/28/2022    [facility]  [address]  [facility] [address]    RE: Rj Pink       Dear Colleague,     I had the pleasure of seeing Rj Pink in the [facility].    Thank you, Dr. Ramirez, for asking the [facility] Heart Care team to see Mr. Rj Pink to evaluate       Assessment/Recommendations   Assessment/Plan:  1. Chest pain - symptoms atypical for angina but with DM and dyspnea on exertion with the mask and tachycardia (could be related to anxiety from coming to physician's office), will start with troponin, d-dimer (air travel in early July), CMP, CBC.  If above normal, plan stress echo. ECG today with no change compared to 2017.    2. CAD s/p PCI - on asp 162mg, on atorvastatin, noted concern for spasm in the past but with headache and dyspnea since change metoprolol to carvedilol, will change back.    3. CV prevention - lipids today (granted non fasting) but will check LDL goal <70  4. HTN - on losartan and now metoprolol  5. Dyspnea on exertion  - with mask only, as above - in addition, may be HFpEF, on jardiance, will track above response.      Follow up 12 mo or sooner pendnig results     History of Present Illness/Subjective    Mr. Rj Pink is a 62 year old male with NIDDM, CAD s/pPCI, symptoms of dyspnea, diaphoresis and sharp pain rsolved post PCI to LAD, sometimes he has at times intermittently chest pain/sharp right now 2:10, and a little more dyspnea with a mask on exertion but without mask feels fine.  He has to go slow up the stairs but without the mask feels fine. No GI/ bleeding, no PND/orthonpea, syncopal events, some dizzy spells.  In the AM has headache and on and off during the day last 7-8 mo.   Blood glucose 110 to 120.  No hx of asthma or tob use.  Has checked BP/HR but does not recall.      Meds  Losartan 50mg  Carvedilol 6.25mg   Atorvastatin 80mg       Physical Examination Review of Systems   /84 (BP Location:  Right arm, Patient Position: Sitting, Cuff Size: Adult Regular)   Pulse 102   Resp 16   Wt 84.4 kg (186 lb)   BMI 30.95 kg/m    Body mass index is 30.95 kg/m .  Wt Readings from Last 3 Encounters:   07/28/22 84.4 kg (186 lb)   11/08/20 87.1 kg (192 lb)   10/26/20 87.1 kg (192 lb)     [unfilled]  General Appearance:   no distress, normal body habitus   ENT/Mouth: membranes moist, no oral lesions or bleeding gums.      EYES:  no scleral icterus, normal conjunctivae   Neck: no carotid bruits or thyromegaly   Chest/Lungs:   lungs are clear to auscultation, no rales or wheezing,  sternal scar, equal chest wall expansion    Cardiovascular:   Regular. Normal first and second heart sounds with no murmurs, rubs, or gallops; the carotid, radial and posterior tibial pulses are intact, Jugular venous pressure , edema bilaterally    Abdomen:  no organomegaly, masses, bruits, or tenderness; bowel sounds are present   Extremities: no cyanosis or clubbing   Skin: no xanthelasma, warm.    Neurologic: normal  bilateral, no tremors     Psychiatric: alert and oriented x3, calm     Review of Systems - 12 points nega other than above      Medical History  Surgical History Family History Social History   Past Medical History:   Diagnosis Date     Chest pain      Coronary artery disease 11/13/2015     Diabetes mellitus, type II (H)      Hypertension      Kidney stone      Myocardial infarction (H)     NSTEMI 2014    Past Surgical History:   Procedure Laterality Date     CARDIAC CATHETERIZATION  10/27/15     CORONARY STENT PLACEMENT  2014     CV CORONARY ANGIOGRAM N/A 9/21/2017    Procedure: Coronary Angiogram;  Surgeon: Claribel Gaspar MD;  Location: [facility];  Service:      CV LEFT HEART CATHETERIZATION WITH LEFT VENTRICULOGRAM N/A 9/21/2017    Procedure: Left Heart Catheterization with Left Ventriculogram;  Surgeon: Claribel Gaspar MD;  Location: [facility] Cath Lab;  Service:      OTHER SURGICAL HISTORY      kidney  stone removal    Family History   Problem Relation Age of Onset     Cerebrovascular Disease Brother 46.00     Coronary Artery Disease No family hx of     Social History     Socioeconomic History     Marital status:      Spouse name: Not on file     Number of children: Not on file     Years of education: Not on file     Highest education level: Not on file   Occupational History     Not on file   Tobacco Use     Smoking status: Never Smoker     Smokeless tobacco: Never Used   Substance and Sexual Activity     Alcohol use: Yes     Comment: Alcoholic Drinks/day: once in a few months     Drug use: No     Sexual activity: Not on file   Other Topics Concern     Not on file   Social History Narrative     Not on file     Social Determinants of Health     Financial Resource Strain: Not on file   Food Insecurity: Not on file   Transportation Needs: Not on file   Physical Activity: Not on file   Stress: Not on file   Social Connections: Not on file   Intimate Partner Violence: Not on file   Housing Stability: Not on file          Medications  Allergies   Scheduled Meds:  Continuous Infusions:  PRN Meds:. Allergies   Allergen Reactions     Lisinopril Cough         Lab Results    Chemistry/lipid CBC Cardiac Enzymes/BNP/TSH/INR   Lab Results   Component Value Date    CHOL 113 09/21/2017    HDL 23 (L) 09/21/2017    TRIG 271 (H) 09/21/2017    BUN 23 (H) 04/22/2018     04/22/2018    CO2 28 04/22/2018    Lab Results   Component Value Date    WBC 9.9 04/22/2018    HGB 14.1 04/22/2018    HCT 41.1 04/22/2018    MCV 85 04/22/2018     04/22/2018    No results found for: CKTOTAL, CKMB, TROPONINI, BNP, TSH, INR           Willard Ramirez MD  Interventional Cardiology  [facility]      Thank you for allowing me to participate in the care of your patient.      Sincerely,     Willard Ramirez MD     [facility]  cc:   Willard Ramirez,  MD  [address] [place] [id number]

## 2022-07-29 LAB
ATRIAL RATE - MUSE: 97 BPM
DIASTOLIC BLOOD PRESSURE - MUSE: NORMAL MMHG
INTERPRETATION ECG - MUSE: NORMAL
P AXIS - MUSE: -12 DEGREES
PR INTERVAL - MUSE: 222 MS
QRS DURATION - MUSE: 106 MS
QT - MUSE: 360 MS
QTC - MUSE: 457 MS
R AXIS - MUSE: -48 DEGREES
SYSTOLIC BLOOD PRESSURE - MUSE: NORMAL MMHG
T AXIS - MUSE: 16 DEGREES
VENTRICULAR RATE- MUSE: 97 BPM

## 2024-03-26 ENCOUNTER — OFFICE VISIT (OUTPATIENT)
Dept: FAMILY MEDICINE | Facility: CLINIC | Age: 64
End: 2024-03-26
Payer: COMMERCIAL

## 2024-03-26 VITALS
HEART RATE: 104 BPM | DIASTOLIC BLOOD PRESSURE: 78 MMHG | SYSTOLIC BLOOD PRESSURE: 118 MMHG | OXYGEN SATURATION: 97 % | BODY MASS INDEX: 33.45 KG/M2 | HEIGHT: 62 IN | TEMPERATURE: 97.8 F | WEIGHT: 181.8 LBS

## 2024-03-26 DIAGNOSIS — I25.10 CORONARY ARTERY DISEASE INVOLVING NATIVE CORONARY ARTERY OF NATIVE HEART WITHOUT ANGINA PECTORIS: ICD-10-CM

## 2024-03-26 DIAGNOSIS — E78.5 HYPERLIPIDEMIA LDL GOAL <70: ICD-10-CM

## 2024-03-26 DIAGNOSIS — Z79.4 CONTROLLED TYPE 2 DIABETES MELLITUS WITHOUT COMPLICATION, WITH LONG-TERM CURRENT USE OF INSULIN (H): ICD-10-CM

## 2024-03-26 DIAGNOSIS — E11.9 CONTROLLED TYPE 2 DIABETES MELLITUS WITHOUT COMPLICATION, WITH LONG-TERM CURRENT USE OF INSULIN (H): ICD-10-CM

## 2024-03-26 DIAGNOSIS — I21.4 NON-ST ELEVATION MYOCARDIAL INFARCTION (NSTEMI) (H): ICD-10-CM

## 2024-03-26 DIAGNOSIS — I10 ESSENTIAL HYPERTENSION: ICD-10-CM

## 2024-03-26 DIAGNOSIS — Z00.00 ROUTINE GENERAL MEDICAL EXAMINATION AT A HEALTH CARE FACILITY: Primary | ICD-10-CM

## 2024-03-26 DIAGNOSIS — I25.9 CHEST PAIN DUE TO MYOCARDIAL ISCHEMIA, UNSPECIFIED ISCHEMIC CHEST PAIN TYPE: ICD-10-CM

## 2024-03-26 PROCEDURE — 99214 OFFICE O/P EST MOD 30 MIN: CPT | Mod: 25 | Performed by: NURSE PRACTITIONER

## 2024-03-26 PROCEDURE — 99386 PREV VISIT NEW AGE 40-64: CPT | Performed by: NURSE PRACTITIONER

## 2024-03-26 RX ORDER — LOSARTAN POTASSIUM 100 MG/1
TABLET ORAL
COMMUNITY
Start: 2024-01-24 | End: 2024-03-26

## 2024-03-26 RX ORDER — EMPAGLIFLOZIN 25 MG/1
TABLET, FILM COATED ORAL
COMMUNITY
Start: 2024-02-15 | End: 2024-03-26

## 2024-03-26 RX ORDER — METFORMIN HCL 500 MG
1000 TABLET, EXTENDED RELEASE 24 HR ORAL 2 TIMES DAILY WITH MEALS
Qty: 360 TABLET | Refills: 1 | Status: SHIPPED | OUTPATIENT
Start: 2024-03-26 | End: 2024-08-13

## 2024-03-26 RX ORDER — INSULIN GLARGINE 100 [IU]/ML
40 INJECTION, SOLUTION SUBCUTANEOUS AT BEDTIME
Status: CANCELLED | OUTPATIENT
Start: 2024-03-26

## 2024-03-26 RX ORDER — ATOVAQUONE AND PROGUANIL HYDROCHLORIDE 250; 100 MG/1; MG/1
TABLET, FILM COATED ORAL
COMMUNITY
Start: 2023-06-27 | End: 2024-03-26

## 2024-03-26 RX ORDER — INSULIN GLARGINE 100 [IU]/ML
35 INJECTION, SOLUTION SUBCUTANEOUS AT BEDTIME
Qty: 45 ML | Refills: 3 | Status: SHIPPED | OUTPATIENT
Start: 2024-03-26 | End: 2024-08-29

## 2024-03-26 RX ORDER — INSULIN GLARGINE 100 [IU]/ML
INJECTION, SOLUTION SUBCUTANEOUS
COMMUNITY
Start: 2024-02-15 | End: 2024-03-26

## 2024-03-26 RX ORDER — LANCETS 28 GAUGE
EACH MISCELLANEOUS
COMMUNITY
Start: 2023-11-04

## 2024-03-26 RX ORDER — LOSARTAN POTASSIUM 100 MG/1
100 TABLET ORAL DAILY
Qty: 90 TABLET | Refills: 3 | Status: SHIPPED | OUTPATIENT
Start: 2024-03-26 | End: 2024-08-29

## 2024-03-26 RX ORDER — EMPAGLIFLOZIN 25 MG/1
25 TABLET, FILM COATED ORAL DAILY
Qty: 90 TABLET | Refills: 1 | Status: SHIPPED | OUTPATIENT
Start: 2024-03-26 | End: 2024-08-13

## 2024-03-26 RX ORDER — METOPROLOL SUCCINATE 50 MG/1
50 TABLET, EXTENDED RELEASE ORAL AT BEDTIME
Qty: 90 TABLET | Refills: 3 | Status: SHIPPED | OUTPATIENT
Start: 2024-03-26

## 2024-03-26 RX ORDER — ATORVASTATIN CALCIUM 80 MG/1
80 TABLET, FILM COATED ORAL EVERY EVENING
Qty: 90 TABLET | Refills: 11 | Status: SHIPPED | OUTPATIENT
Start: 2024-03-26

## 2024-03-26 RX ORDER — NITROGLYCERIN 0.4 MG/1
0.4 TABLET SUBLINGUAL EVERY 5 MIN PRN
Qty: 30 TABLET | Refills: 11 | Status: SHIPPED | OUTPATIENT
Start: 2024-03-26

## 2024-03-26 SDOH — HEALTH STABILITY: PHYSICAL HEALTH: ON AVERAGE, HOW MANY DAYS PER WEEK DO YOU ENGAGE IN MODERATE TO STRENUOUS EXERCISE (LIKE A BRISK WALK)?: 4 DAYS

## 2024-03-26 SDOH — HEALTH STABILITY: PHYSICAL HEALTH: ON AVERAGE, HOW MANY MINUTES DO YOU ENGAGE IN EXERCISE AT THIS LEVEL?: 30 MIN

## 2024-03-26 ASSESSMENT — SOCIAL DETERMINANTS OF HEALTH (SDOH): HOW OFTEN DO YOU GET TOGETHER WITH FRIENDS OR RELATIVES?: PATIENT DECLINED

## 2024-03-26 ASSESSMENT — PAIN SCALES - GENERAL: PAINLEVEL: NO PAIN (0)

## 2024-03-26 NOTE — PATIENT INSTRUCTIONS
Start ozempic 0.25 mg weekly for 4 weeks, then increase to 0.5 mg for 4 weeks.  Follow-up in clinic for lab and recheck on diabetes with changes.  LANTUS: If morning blood sugars are under 100 decrease your lantus 2 units.  If between 100-150 then continue current dose.  If blood sugars are over 150 in the morning increase by 2 units.  Make appointment with diabetic educator to watch changes in medication.  Preventive Care Advice   This is general advice given by our system to help you stay healthy. However, your care team may have specific advice just for you. Please talk to your care team about your preventive care needs.  Nutrition  Eat 5 or more servings of fruits and vegetables each day.  Try wheat bread, brown rice and whole grain pasta (instead of white bread, rice, and pasta).  Get enough calcium and vitamin D. Check the label on foods and aim for 100% of the RDA (recommended daily allowance).  Lifestyle  Exercise at least 150 minutes each week   (30 minutes a day, 5 days a week).  Do muscle strengthening activities 2 days a week. These help control your weight and prevent disease.  No smoking.  Wear sunscreen to prevent skin cancer.  Have a dental exam and cleaning every 6 months.  Yearly exams  See your health care team every year to talk about:  Any changes in your health.  Any medicines your care team has prescribed.  Preventive care, family planning, and ways to prevent chronic diseases.  Shots (vaccines)   HPV shots (up to age 26), if you've never had them before.  Hepatitis B shots (up to age 59), if you've never had them before.  COVID-19 shot: Get this shot when it's due.  Flu shot: Get a flu shot every year.  Tetanus shot: Get a tetanus shot every 10 years.  Pneumococcal, hepatitis A, and RSV shots: Ask your care team if you need these based on your risk.  Shingles shot (for age 50 and up).  General health tests  Diabetes screening:  Starting at age 35, Get screened for diabetes at least every 3  years.  If you are younger than age 35, ask your care team if you should be screened for diabetes.  Cholesterol test: At age 39, start having a cholesterol test every 5 years, or more often if advised.  Bone density scan (DEXA): At age 50, ask your care team if you should have this scan for osteoporosis (brittle bones).  Hepatitis C: Get tested at least once in your life.  STIs (sexually transmitted infections)  Before age 24: Ask your care team if you should be screened for STIs.  After age 24: Get screened for STIs if you're at risk. You are at risk for STIs (including HIV) if:  You are sexually active with more than one person.  You don't use condoms every time.  You or a partner was diagnosed with a sexually transmitted infection.  If you are at risk for HIV, ask about PrEP medicine to prevent HIV.  Get tested for HIV at least once in your life, whether you are at risk for HIV or not.  Cancer screening tests  Cervical cancer screening: If you have a cervix, begin getting regular cervical cancer screening tests at age 21. Most people who have regular screenings with normal results can stop after age 65. Talk about this with your provider.  Breast cancer scan (mammogram): If you've ever had breasts, begin having regular mammograms starting at age 40. This is a scan to check for breast cancer.  Colon cancer screening: It is important to start screening for colon cancer at age 45.  Have a colonoscopy test every 10 years (or more often if you're at risk) Or, ask your provider about stool tests like a FIT test every year or Cologuard test every 3 years.  To learn more about your testing options, visit: https://www.[employer]/515352.pdf.  For help making a decision, visit: https://bit.ly/xr66347.  Prostate cancer screening test: If you have a prostate and are age 55 to 69, ask your provider if you would benefit from a yearly prostate cancer screening test.  Lung cancer screening: If you are a current or former smoker  age 50 to 80, ask your care team if ongoing lung cancer screenings are right for you.  For informational purposes only. Not to replace the advice of your health care provider. Copyright   2023 [facility] [employer]. All rights reserved. Clinically reviewed by the [facility] Transitions Program. [employer] [id number] - REV 01/24.

## 2024-03-26 NOTE — PROGRESS NOTES
Preventive Care Visit  [facility]  Elda Cheung NP, Family Medicine  Mar 26, 2024    Assessment & Plan     Routine general medical examination at a health care facility  Advised that he needs to go to the pharmacy for his RSV vaccination and second shingles vaccination.  Patient is up to date on colonoscopy.  Reviewed preventative health recommendations and advised follow-up in 1 year for annual preventative visit.  - REVIEW OF HEALTH MAINTENANCE PROTOCOL ORDERS  - PRIMARY CARE FOLLOW-UP SCHEDULING; Future    Controlled type 2 diabetes mellitus without complication, with long-term current use of insulin (H)  A1c recently completed 2/14/24 and was 7.4%.  Patient is on Lantus 35 units daily and metformin 1000 mg BID.  I would like to try to add GLP-1 and see if this helps to get him better under control with less use of insulin.  I have ordered ozempic 0..25 mg for 1 month and then increasing to 0.5 mg for a month.  I recommend follow-up in 2 months for recheck A1c to see if this is improving his A1C.  I advised on how to lower insulin as he is getting on this medication to avoid low blood sugars.  I also referred him to diabetic education to work on insulin reduction during the time he is getting on the ozempic.  - Albumin Random Urine Quantitative with Creat Ratio; Future  - LANTUS SOLOSTAR 100 UNIT/ML soln; Inject 35 Units Subcutaneous at bedtime  - metFORMIN (GLUCOPHAGE XR) 500 MG 24 hr tablet; Take 2 tablets (1,000 mg) by mouth 2 times daily (with meals)  - JARDIANCE 25 MG TABS tablet; Take 1 tablet (25 mg) by mouth daily  - semaglutide (OZEMPIC) 2 MG/3ML pen; Inject 0.25 mg Subcutaneous every 7 days for 28 days  - semaglutide (OZEMPIC) 2 MG/3ML pen; Inject 0.5 mg Subcutaneous every 7 days for 28 days  - Adult Diabetes Education  Referral; Future    Coronary artery disease involving native coronary artery of native heart without angina pectoris  Stable.  Refilled  "nitorstat on file at the pharmacy for as needed use.   - nitroGLYcerin (NITROSTAT) 0.4 MG sublingual tablet; Place 1 tablet (0.4 mg) under the tongue every 5 minutes as needed for chest pain    Non-ST elevation myocardial infarction (NSTEMI) (H)  Stable.  - nitroGLYcerin (NITROSTAT) 0.4 MG sublingual tablet; Place 1 tablet (0.4 mg) under the tongue every 5 minutes as needed for chest pain    Chest pain due to myocardial ischemia, unspecified ischemic chest pain type  Stable.  - nitroGLYcerin (NITROSTAT) 0.4 MG sublingual tablet; Place 1 tablet (0.4 mg) under the tongue every 5 minutes as needed for chest pain    Essential hypertension  Stable.  BMP ordered for monitoring electrolytes and kidney function,.  Refilled losartan and metoprolol for 1 year.  - BASIC METABOLIC PANEL; Future  - losartan (COZAAR) 100 MG tablet; Take 1 tablet (100 mg) by mouth daily  - metoprolol succinate ER (TOPROL XL) 50 MG 24 hr tablet; Take 1 tablet (50 mg) by mouth at bedtime    Hyperlipidemia LDL goal <70  Recent lipids were stable.  Refilled Lipitor for 1 year.  - atorvastatin (LIPITOR) 80 MG tablet; Take 1 tablet (80 mg) by mouth every evening    Patient has been advised of split billing requirements and indicates understanding: Yes    BMI  Estimated body mass index is 33.79 kg/m  as calculated from the following:    Height as of this encounter: 1.562 m (5' 1.5\").    Weight as of this encounter: 82.5 kg (181 lb 12.8 oz).   Weight management plan: Discussed healthy diet and exercise guidelines    Counseling  Appropriate preventive services were discussed with this patient, including applicable screening as appropriate for fall prevention, nutrition, physical activity, Tobacco-use cessation, weight loss and cognition.  Checklist reviewing preventive services available has been given to the patient.  Reviewed patient's diet, addressing concerns and/or questions.   The patient was instructed to see the dentist every 6 months.       See " Patient Instructions    Handy De La Rosa is a 64 year old, presenting for the following:  Physical, Hypertension, Diabetes, and Lipids        3/26/2024     1:09 PM   Additional Questions   Roomed by rmb   Accompanied by self         3/26/2024     1:09 PM   Patient Reported Additional Medications   Patient reports taking the following new medications none        Health Care Directive  Patient does not have a Health Care Directive or Living Will: Discussed advance care planning with patient; information given to patient to review.    HPI    Diabetes Follow-up    How often are you checking your blood sugar? One time daily  What time of day are you checking your blood sugars (select all that apply)?  Before meals  Have you had any blood sugars above 200?  No  Have you had any blood sugars below 70?  No  What symptoms do you notice when your blood sugar is low?  None  What concerns do you have today about your diabetes? None   Do you have any of these symptoms? (Select all that apply)  No numbness or tingling in feet.  No redness, sores or blisters on feet.  No complaints of excessive thirst.  No reports of blurry vision.  No significant changes to weight.  Have you had a diabetic eye exam in the last 12 months? No      Hyperlipidemia Follow-Up    Are you regularly taking any medication or supplement to lower your cholesterol?   No  Are you having muscle aches or other side effects that you think could be caused by your cholesterol lowering medication?  No    Hypertension Follow-up    Do you check your blood pressure regularly outside of the clinic? Yes   Are you following a low salt diet? Yes  Are your blood pressures ever more than 140 on the top number (systolic) OR more   than 90 on the bottom number (diastolic), for example 140/90? No    BP Readings from Last 2 Encounters:   03/26/24 118/78   07/28/22 120/84     Hemoglobin A1C (%)   Date Value   06/30/2017 7.5 (H)   07/04/2014 9.2 (H)     LDL Cholesterol Calculated  (mg/dL)   Date Value   07/28/2022 47   09/21/2017 36     LDL Cholesterol Direct (mg/dL)   Date Value   08/31/2012 59   04/25/2011 90     How many servings of fruits and vegetables do you eat daily?  2-3  On average, how many sweetened beverages do you drink each day (Examples: soda, juice, sweet tea, etc.  Do NOT count diet or artificially sweetened beverages)?   1 a month  How many days per week do you exercise enough to make your heart beat faster? 4  How many minutes a day do you exercise enough to make your heart beat faster? 30 - 60  How many days per week do you miss taking your medication? 0        3/26/2024   General Health   How would you rate your overall physical health? Good         3/26/2024   Nutrition   Three or more servings of calcium each day? Yes   Diet: Diabetic   How many servings of fruit and vegetables per day? (!) 2-3   How many sweetened beverages each day? 0-1         3/26/2024   Exercise   Days per week of moderate/strenous exercise 4 days   Average minutes spent exercising at this level 30 min        Today's PHQ-2 Score:       3/26/2024     1:13 PM   PHQ-2 ( 1999 [employer])   Q1: Little interest or pleasure in doing things 0   Q2: Feeling down, depressed or hopeless 0   PHQ-2 Score 0   Q1: Little interest or pleasure in doing things Not at all   Q2: Feeling down, depressed or hopeless Not at all   PHQ-2 Score 0     Social History     Tobacco Use    Smoking status: Never    Smokeless tobacco: Never   Vaping Use    Vaping Use: Never used   Substance Use Topics    Alcohol use: Yes     Comment: Alcoholic Drinks/day: once in a few months    Drug use: No     Last PSA:   Prostate Specific Antigen Screen   Date Value Ref Range Status   08/31/2012 0.5 <3.6 ng/mL Final     Comment:     Method is Abbott Prostate-Specific Antigen (PSA)            Standard-WHO 1st International (90:10) as of 09/26/05       ASCVD Risk   The ASCVD Risk score (Ramonita MATA, et al., 2019) failed to calculate for the  following reasons:    The patient has a prior MI or stroke diagnosis    Reviewed and updated as needed this visit by Provider   Tobacco  Allergies  Meds  Problems  Med Hx  Surg Hx  Fam Hx  Soc   Hx Sexual Activity        Past Medical History:   Diagnosis Date    Chest pain     Coronary artery disease 11/13/2015    Diabetes mellitus, type II (H)     Hypertension     Kidney stone     Myocardial infarction (H)     NSTEMI 2014     Past Surgical History:   Procedure Laterality Date    CARDIAC CATHETERIZATION  10/27/15    CORONARY STENT PLACEMENT  2014    CV CORONARY ANGIOGRAM N/A 9/21/2017    Procedure: Coronary Angiogram;  Surgeon: Claribel Gaspar MD;  Location: [facility] Cath Lab;  Service:     CV LEFT HEART CATHETERIZATION WITH LEFT VENTRICULOGRAM N/A 9/21/2017    Procedure: Left Heart Catheterization with Left Ventriculogram;  Surgeon: Claribel Gaspar MD;  Location: [facility] Cath Lab;  Service:     OTHER SURGICAL HISTORY      kidney stone removal     Lab work is in process  Labs reviewed in EPIC  BP Readings from Last 3 Encounters:   03/26/24 118/78   07/28/22 120/84   11/08/20 (!) 148/92    Wt Readings from Last 3 Encounters:   03/26/24 82.5 kg (181 lb 12.8 oz)   07/28/22 84.4 kg (186 lb)   11/08/20 87.1 kg (192 lb)                  Patient Active Problem List   Diagnosis    Obesity    Epicondylitis    Male Erectile Disorder    Mixed hyperlipidemia    Joint Pain, Localized In The Shoulder    Diabetes mellitus type II, controlled (H)    Essential hypertension    Non-STEMI (non-ST elevated myocardial infarction) (H)    Chest pain    Coronary artery disease involving native coronary artery of native heart, angina presence unspecified    Nausea after anesthesia     Past Surgical History:   Procedure Laterality Date    CARDIAC CATHETERIZATION  10/27/15    CORONARY STENT PLACEMENT  2014    CV CORONARY ANGIOGRAM N/A 9/21/2017    Procedure: Coronary Angiogram;  Surgeon: Claribel Gaspar MD;  Location:   [facility] Cath Lab;  Service:     CV LEFT HEART CATHETERIZATION WITH LEFT VENTRICULOGRAM N/A 9/21/2017    Procedure: Left Heart Catheterization with Left Ventriculogram;  Surgeon: Claribel Gaspar MD;  Location: [facility] Cath Lab;  Service:     OTHER SURGICAL HISTORY      kidney stone removal       Social History     Tobacco Use    Smoking status: Never    Smokeless tobacco: Never   Substance Use Topics    Alcohol use: Yes     Comment: Alcoholic Drinks/day: once in a few months     Family History   Problem Relation Age of Onset    Hyperlipidemia Mother     Hypertension Mother     Diabetes Mother     Hyperlipidemia Father     Hypertension Father     Diabetes Father     Cerebrovascular Disease Brother 46    Coronary Artery Disease No family hx of          Current Outpatient Medications   Medication Sig Dispense Refill    aspirin 81 MG EC tablet [ASPIRIN 81 MG EC TABLET] Take 2 tablets (162 mg total) by mouth daily.  0    atorvastatin (LIPITOR) 80 MG tablet Take 1 tablet (80 mg) by mouth every evening 90 tablet 11    Easy Touch Lancets 28G MISC TEST THREE TIMES DAILY ON INSULIN      insulin glargine (LANTUS) 100 unit/mL injection [INSULIN GLARGINE (LANTUS) 100 UNIT/ML INJECTION] Inject 40 Units under the skin at bedtime.       JARDIANCE 25 MG TABS tablet Take 1 tablet (25 mg) by mouth daily 90 tablet 1    LANTUS SOLOSTAR 100 UNIT/ML soln Inject 35 Units Subcutaneous at bedtime 45 mL 3    losartan (COZAAR) 100 MG tablet Take 1 tablet (100 mg) by mouth daily 90 tablet 3    metFORMIN (GLUCOPHAGE XR) 500 MG 24 hr tablet Take 2 tablets (1,000 mg) by mouth 2 times daily (with meals) 360 tablet 1    metoprolol succinate ER (TOPROL XL) 50 MG 24 hr tablet Take 1 tablet (50 mg) by mouth at bedtime 90 tablet 3    nitroGLYcerin (NITROSTAT) 0.4 MG sublingual tablet Place 1 tablet (0.4 mg) under the tongue every 5 minutes as needed for chest pain 30 tablet 11     Allergies   Allergen Reactions    Lisinopril Cough     Recent Labs  "  Lab Test 07/28/22  0951 04/22/18  0315 09/21/17  0836 06/30/17  0542   A1C  --   --   --  7.5*   LDL 47  --  36  --    HDL 24*  --  23*  --    TRIG 166*  --  271*  --    ALT 19 34  --   --    CR 1.33* 1.30  --   --    GFRESTIMATED 61 57*  --   --    GFRESTBLACK  --  >60  --   --    POTASSIUM 4.8 3.7  --   --           Review of Systems  CONSTITUTIONAL: NEGATIVE for fever, chills, change in weight  INTEGUMENTARY/SKIN: NEGATIVE for worrisome rashes, moles or lesions  ENT/MOUTH: NEGATIVE for ear, mouth and throat problems  RESP: NEGATIVE for significant cough or SOB  CV: NEGATIVE for chest pain, palpitations or peripheral edema  GI: NEGATIVE for nausea, abdominal pain, heartburn, or change in bowel habits  MUSCULOSKELETAL: NEGATIVE for significant arthralgias or myalgia, except some shoulder pain and tennis elbow intermittently  NEURO: NEGATIVE for weakness, dizziness or paresthesias  ENDOCRINE: NEGATIVE for temperature intolerance, skin/hair changes  HEME/ALLERGY/IMMUNE: NEGATIVE for bleeding problems  PSYCHIATRIC: NEGATIVE for changes in mood or affect  ROS otherwise negative     Objective    Exam  /78   Pulse 104   Temp 97.8  F (36.6  C) (Tympanic)   Ht 1.562 m (5' 1.5\")   Wt 82.5 kg (181 lb 12.8 oz)   SpO2 97%   BMI 33.79 kg/m     Estimated body mass index is 33.79 kg/m  as calculated from the following:    Height as of this encounter: 1.562 m (5' 1.5\").    Weight as of this encounter: 82.5 kg (181 lb 12.8 oz).    Physical Exam  GENERAL: alert and no distress  EYES: Eyes grossly normal to inspection, PERRL and conjunctivae and sclerae normal  HENT: ear canals and TM's normal, nose and mouth without ulcers or lesions  NECK: no adenopathy, no asymmetry, masses, or scars  RESP: lungs clear to auscultation - no rales, rhonchi or wheezes  CV: regular rate and rhythm, normal S1 S2, no S3 or S4, no murmur, click or rub, no peripheral edema  ABDOMEN: soft, nontender, no hepatosplenomegaly, no masses and bowel " sounds normal  MS: no gross musculoskeletal defects noted, no edema  SKIN: no suspicious lesions or rashes  NEURO: Normal strength and tone, mentation intact and speech normal  PSYCH: mentation appears normal, affect normal/bright  LYMPH: normal ant/post cervical, supraclavicular nodes  Diabetic foot exam: normal DP and PT pulses, no trophic changes or ulcerative lesions, and normal sensory exam        Signed Electronically by: Elda Cheung NP

## 2024-03-26 NOTE — COMMUNITY RESOURCES LIST (ENGLISH)
March 26, 2024           YOUR PERSONALIZED LIST OF SERVICES & PROGRAMS           & SHELTER    Housing      Free - Client Services  [address] [place] [address] (Distance: 22.6 miles)  Phone: [phone]  Website: https://Snocap.SocialVest/  Language: English  Fee: Free  Transportation Options: Free transportation      [facility] residential  Phone: [phone]  Website: https://www.DS Digitale Seiten.org/  Language: English      [facility] - Housing Stabilization Services  Phone: [phone] [phone]  Website: https://[place]/Housing-Stabilization.html  Language: English  Hours: Mon 9:00 AM - 5:00 PM Tue 9:00 AM - 5:00 PM Wed 9:00 AM - 5:00 PM Thu 9:00 AM - 5:00 PM Fri 9:00 AM - 5:00 PM  Fee: Insurance  Accessibility: Deaf or hard of hearing, Translation services    Case Management      [employer] - Housing Stabilization Services  [address] [place] [address] (Distance: 21.0 miles)  Phone: [phone]  Website: https://www.opportunitycsRel[employer] Medical.org/  Language: Romanian, English, Guamanian, Hmong, Beninese, [place] Islander  Fee: Insurance  Accessibility: Ada accessible, Blind accommodation, Deaf or hard of hearing, Translation services  Transportation Options: Free transportation      Living - Housing Stabilization Services  [address] [place] [address] (Distance: 24.9 miles)  Phone: [phone]  Website: https://Packback.Expanite  Language: Romanian, English, Beninese  Fee: Insurance, Self pay      [employer] - Housing Stabilization Services  Phone: [phone]  Website: https://homebasemn.com/  Language: English  Hours: Mon 8:00 AM - 4:00 PM Tue 8:00 AM - 4:00 PM Wed 8:00 AM - 4:00 PM Thu 8:00 AM - 4:00 PM Fri 8:00 AM - 4:00 PM  Fee: Free  Accessibility: Blind accommodation, Deaf or hard of hearing  Transportation Options: Free transportation    Drop-In Services      [facility] Warming or cooling center  [address] [address] (Distance: 14.9 miles)  Language: English  Fee: Free      [facility] Warming or cooling center  [address] [place] [address] (Distance: 14.9 miles)  Language: English, Venezuelan, Ailyn, Telugu, Israeli, Hmong, Tamil  Fee: Free      [employer] - LAUNDRY LOVE  Website: http://www.laundrylove.org               IMPORTANT NUMBERS & WEBSITES        Emergency Services  911  .   United Way  211 http://211unitedway.org  .   Poison Control  (722) 899-3163 http://[place]poison.org http://wis[place]poison.org  .     Suicide and Crisis Lifeline  988 http://988Tbricksline.org  .   Childhelp [place] Child Abuse Hotline  828.440.4644 http://Childhelphotline.org   .   [place] Sexual Assault Hotline  (410) 314-9735 (HOPE) http://A vida Ã© feita de [place].org   .     [place] Runaway Safeline  (697) 544-6757 (RUNAWAY) http://Chiral QuestruAspectiva.org  .   Pregnancy & Postpartum Support  Call/text 539-065-9959  [place]: http://ppsupport[place].org  [place]: http://TradeKing.com/wi  .   Substance Abuse National Helpline ([facility])  846-207-HELP (2118) http://Findtreatment.gov   .                DISCLAIMER: [employer] does not endorse any service providers mentioned in this resource list. [employer] does not guarantee that the services mentioned in this resource list will be available to you or will improve your health or wellness.    [facility]

## 2024-03-27 ENCOUNTER — TELEPHONE (OUTPATIENT)
Dept: FAMILY MEDICINE | Facility: CLINIC | Age: 64
End: 2024-03-27
Payer: COMMERCIAL

## 2024-03-28 NOTE — TELEPHONE ENCOUNTER
Prior Authorization Retail Medication Request    Medication/Dose: Ozempic   Diagnosis and ICD code (if different than what is on RX):    New/renewal/insurance change PA/secondary ins. PA:  Previously Tried and Failed:  lantus; jardiance; metformin  Rationale:      Insurance   Primary: Hem[employer] [phone]  Insurance ID:  [id number]    Secondary (if applicable):  Insurance ID:      Pharmacy Information (if different than what is on RX)  Name:    Phone:    Fax:

## 2024-04-01 ENCOUNTER — TRANSFERRED RECORDS (OUTPATIENT)
Dept: MULTI SPECIALTY CLINIC | Facility: CLINIC | Age: 64
End: 2024-04-01

## 2024-04-01 LAB — RETINOPATHY: NORMAL

## 2024-04-09 NOTE — TELEPHONE ENCOUNTER
Retail Pharmacy Prior Authorization Team   Phone: [phone]    PA Initiation    Medication: OZEMPIC (0.25 OR 0.5 MG/DOSE) 2 MG/3ML SC SOPN  Insurance Company: Hemal - Phone [phone] Fax [phone]  Pharmacy Filling the Rx: [id number] [place], [place] - [facility] [address]   Filling Pharmacy Phone: [phone]  Filling Pharmacy Fax:    Start Date: 4/9/2024    Insurance already has an approval on file.  Called pharmacy and they did get a paid claim the end of March and the patient picked up.

## 2024-04-18 DIAGNOSIS — Z79.4 CONTROLLED TYPE 2 DIABETES MELLITUS WITHOUT COMPLICATION, WITH LONG-TERM CURRENT USE OF INSULIN (H): ICD-10-CM

## 2024-04-18 DIAGNOSIS — E11.9 CONTROLLED TYPE 2 DIABETES MELLITUS WITHOUT COMPLICATION, WITH LONG-TERM CURRENT USE OF INSULIN (H): ICD-10-CM

## 2024-04-18 NOTE — TELEPHONE ENCOUNTER
Pending Prescriptions:                       Disp   Refills    semaglutide (OZEMPIC) 2 MG/3ML pen        3 mL   0            Sig: Inject 0.5 mg Subcutaneous every 7 days

## 2024-06-14 DIAGNOSIS — E11.9 CONTROLLED TYPE 2 DIABETES MELLITUS WITHOUT COMPLICATION, WITH LONG-TERM CURRENT USE OF INSULIN (H): ICD-10-CM

## 2024-06-14 DIAGNOSIS — Z79.4 CONTROLLED TYPE 2 DIABETES MELLITUS WITHOUT COMPLICATION, WITH LONG-TERM CURRENT USE OF INSULIN (H): ICD-10-CM

## 2024-06-14 NOTE — TELEPHONE ENCOUNTER
A1C and GFR (normal result) were drawn on 2/14/24. Med meets RN refill protocol requirements.    Bri Liu RN  [facility]

## 2024-07-12 DIAGNOSIS — Z79.4 CONTROLLED TYPE 2 DIABETES MELLITUS WITHOUT COMPLICATION, WITH LONG-TERM CURRENT USE OF INSULIN (H): ICD-10-CM

## 2024-07-12 DIAGNOSIS — E11.9 CONTROLLED TYPE 2 DIABETES MELLITUS WITHOUT COMPLICATION, WITH LONG-TERM CURRENT USE OF INSULIN (H): ICD-10-CM

## 2024-07-12 NOTE — TELEPHONE ENCOUNTER
Requested Prescriptions   Pending Prescriptions Disp Refills    semaglutide (OZEMPIC) 2 MG/3ML pen 3 mL 0     Sig: Inject 0.5 mg subcutaneously every 7 days       GLP-1 Agonists Protocol Failed - 7/12/2024  1:11 PM        Failed - HgbA1C in past 3 or 6 months     If HgbA1C is 8 or greater, it needs to be on file within the past 3 months.  If less than 8, must be on file within the past 6 months.     Recent Labs   Lab Test 06/30/17  0542   A1C 7.5*             Failed - Has GFR on file in past 12 months and most recent value is normal        Passed - Medication is active on med list        Passed - Recent (6 mo) or future (90 days) visit within the authorizing provider's specialty     The patient must have completed an in-person or virtual visit within the past 6 months or has a future visit scheduled within the next 90 days with the authorizing provider s specialty.  Urgent care and e-visits do not quality as an office visit for this protocol.          Passed - Medication indicated for associated diagnosis     Medication is associated with one or more of the following diagnoses:     Type 2 diabetes mellitus           Passed - Patient is age 18 or older

## 2024-08-06 ENCOUNTER — HOSPITAL ENCOUNTER (EMERGENCY)
Facility: HOSPITAL | Age: 64
Discharge: HOME | End: 2024-08-07
Attending: STUDENT IN AN ORGANIZED HEALTH CARE EDUCATION/TRAINING PROGRAM | Admitting: STUDENT IN AN ORGANIZED HEALTH CARE EDUCATION/TRAINING PROGRAM
Payer: COMMERCIAL

## 2024-08-06 ENCOUNTER — APPOINTMENT (OUTPATIENT)
Dept: CT IMAGING | Facility: HOSPITAL | Age: 64
End: 2024-08-06
Attending: STUDENT IN AN ORGANIZED HEALTH CARE EDUCATION/TRAINING PROGRAM
Payer: COMMERCIAL

## 2024-08-06 DIAGNOSIS — N17.9 AKI (ACUTE KIDNEY INJURY) (H): ICD-10-CM

## 2024-08-06 DIAGNOSIS — D18.03 HEMANGIOMA OF INTRA-ABDOMINAL STRUCTURE: ICD-10-CM

## 2024-08-06 DIAGNOSIS — E86.0 DEHYDRATION: ICD-10-CM

## 2024-08-06 LAB
ANION GAP SERPL CALCULATED.3IONS-SCNC: 11 MMOL/L (ref 7–15)
BASOPHILS # BLD AUTO: 0.1 10E3/UL (ref 0–0.2)
BASOPHILS NFR BLD AUTO: 1 %
BUN SERPL-MCNC: 34.2 MG/DL (ref 8–23)
CALCIUM SERPL-MCNC: 9.1 MG/DL (ref 8.8–10.4)
CHLORIDE SERPL-SCNC: 108 MMOL/L (ref 98–107)
CREAT SERPL-MCNC: 2.03 MG/DL (ref 0.67–1.17)
EGFRCR SERPLBLD CKD-EPI 2021: 36 ML/MIN/1.73M2
EOSINOPHIL # BLD AUTO: 0.5 10E3/UL (ref 0–0.7)
EOSINOPHIL NFR BLD AUTO: 6 %
ERYTHROCYTE [DISTWIDTH] IN BLOOD BY AUTOMATED COUNT: 13.4 % (ref 10–15)
GLUCOSE SERPL-MCNC: 68 MG/DL (ref 70–99)
HCO3 SERPL-SCNC: 25 MMOL/L (ref 22–29)
HCT VFR BLD AUTO: 44.3 % (ref 40–53)
HGB BLD-MCNC: 14.5 G/DL (ref 13.3–17.7)
IMM GRANULOCYTES # BLD: 0 10E3/UL
IMM GRANULOCYTES NFR BLD: 0 %
LYMPHOCYTES # BLD AUTO: 2.3 10E3/UL (ref 0.8–5.3)
LYMPHOCYTES NFR BLD AUTO: 25 %
MCH RBC QN AUTO: 29.1 PG (ref 26.5–33)
MCHC RBC AUTO-ENTMCNC: 32.7 G/DL (ref 31.5–36.5)
MCV RBC AUTO: 89 FL (ref 78–100)
MONOCYTES # BLD AUTO: 0.7 10E3/UL (ref 0–1.3)
MONOCYTES NFR BLD AUTO: 7 %
NEUTROPHILS # BLD AUTO: 5.5 10E3/UL (ref 1.6–8.3)
NEUTROPHILS NFR BLD AUTO: 60 %
NRBC # BLD AUTO: 0 10E3/UL
NRBC BLD AUTO-RTO: 0 /100
PLATELET # BLD AUTO: 162 10E3/UL (ref 150–450)
POTASSIUM SERPL-SCNC: 4.1 MMOL/L (ref 3.4–5.3)
RBC # BLD AUTO: 4.98 10E6/UL (ref 4.4–5.9)
SODIUM SERPL-SCNC: 144 MMOL/L (ref 135–145)
TROPONIN T SERPL HS-MCNC: 19 NG/L
WBC # BLD AUTO: 9.1 10E3/UL (ref 4–11)

## 2024-08-06 PROCEDURE — 99285 EMERGENCY DEPT VISIT HI MDM: CPT | Mod: 25

## 2024-08-06 PROCEDURE — 85025 COMPLETE CBC W/AUTO DIFF WBC: CPT | Performed by: STUDENT IN AN ORGANIZED HEALTH CARE EDUCATION/TRAINING PROGRAM

## 2024-08-06 PROCEDURE — 250N000011 HC RX IP 250 OP 636: Performed by: STUDENT IN AN ORGANIZED HEALTH CARE EDUCATION/TRAINING PROGRAM

## 2024-08-06 PROCEDURE — 93005 ELECTROCARDIOGRAM TRACING: CPT | Mod: 76

## 2024-08-06 PROCEDURE — 74174 CTA ABD&PLVS W/CONTRAST: CPT

## 2024-08-06 PROCEDURE — 93005 ELECTROCARDIOGRAM TRACING: CPT | Performed by: STUDENT IN AN ORGANIZED HEALTH CARE EDUCATION/TRAINING PROGRAM

## 2024-08-06 PROCEDURE — 258N000003 HC RX IP 258 OP 636: Performed by: STUDENT IN AN ORGANIZED HEALTH CARE EDUCATION/TRAINING PROGRAM

## 2024-08-06 PROCEDURE — 80048 BASIC METABOLIC PNL TOTAL CA: CPT | Performed by: STUDENT IN AN ORGANIZED HEALTH CARE EDUCATION/TRAINING PROGRAM

## 2024-08-06 PROCEDURE — 36415 COLL VENOUS BLD VENIPUNCTURE: CPT | Performed by: STUDENT IN AN ORGANIZED HEALTH CARE EDUCATION/TRAINING PROGRAM

## 2024-08-06 PROCEDURE — 93308 TTE F-UP OR LMTD: CPT

## 2024-08-06 PROCEDURE — 96360 HYDRATION IV INFUSION INIT: CPT | Mod: 59

## 2024-08-06 PROCEDURE — 84484 ASSAY OF TROPONIN QUANT: CPT | Performed by: STUDENT IN AN ORGANIZED HEALTH CARE EDUCATION/TRAINING PROGRAM

## 2024-08-06 RX ORDER — IOPAMIDOL 755 MG/ML
75 INJECTION, SOLUTION INTRAVASCULAR ONCE
Status: COMPLETED | OUTPATIENT
Start: 2024-08-07 | End: 2024-08-06

## 2024-08-06 RX ADMIN — SODIUM CHLORIDE 1000 ML: 9 INJECTION, SOLUTION INTRAVENOUS at 23:37

## 2024-08-06 RX ADMIN — IOPAMIDOL 75 ML: 755 INJECTION, SOLUTION INTRAVENOUS at 23:53

## 2024-08-06 ASSESSMENT — COLUMBIA-SUICIDE SEVERITY RATING SCALE - C-SSRS
6. HAVE YOU EVER DONE ANYTHING, STARTED TO DO ANYTHING, OR PREPARED TO DO ANYTHING TO END YOUR LIFE?: NO
1. IN THE PAST MONTH, HAVE YOU WISHED YOU WERE DEAD OR WISHED YOU COULD GO TO SLEEP AND NOT WAKE UP?: NO
2. HAVE YOU ACTUALLY HAD ANY THOUGHTS OF KILLING YOURSELF IN THE PAST MONTH?: NO

## 2024-08-06 ASSESSMENT — ACTIVITIES OF DAILY LIVING (ADL): ADLS_ACUITY_SCORE: 35

## 2024-08-07 ENCOUNTER — TELEPHONE (OUTPATIENT)
Dept: FAMILY MEDICINE | Facility: CLINIC | Age: 64
End: 2024-08-07
Payer: COMMERCIAL

## 2024-08-07 ENCOUNTER — ANCILLARY PROCEDURE (OUTPATIENT)
Dept: ULTRASOUND IMAGING | Facility: HOSPITAL | Age: 64
End: 2024-08-07
Attending: STUDENT IN AN ORGANIZED HEALTH CARE EDUCATION/TRAINING PROGRAM
Payer: COMMERCIAL

## 2024-08-07 VITALS
RESPIRATION RATE: 24 BRPM | WEIGHT: 181 LBS | DIASTOLIC BLOOD PRESSURE: 83 MMHG | SYSTOLIC BLOOD PRESSURE: 123 MMHG | OXYGEN SATURATION: 100 % | BODY MASS INDEX: 34.17 KG/M2 | HEIGHT: 61 IN | HEART RATE: 123 BPM | TEMPERATURE: 97.7 F

## 2024-08-07 DIAGNOSIS — Z79.4 CONTROLLED TYPE 2 DIABETES MELLITUS WITHOUT COMPLICATION, WITH LONG-TERM CURRENT USE OF INSULIN (H): ICD-10-CM

## 2024-08-07 DIAGNOSIS — E11.9 CONTROLLED TYPE 2 DIABETES MELLITUS WITHOUT COMPLICATION, WITH LONG-TERM CURRENT USE OF INSULIN (H): ICD-10-CM

## 2024-08-07 LAB — TROPONIN T SERPL HS-MCNC: 18 NG/L

## 2024-08-07 PROCEDURE — 93005 ELECTROCARDIOGRAM TRACING: CPT | Performed by: STUDENT IN AN ORGANIZED HEALTH CARE EDUCATION/TRAINING PROGRAM

## 2024-08-07 PROCEDURE — 250N000013 HC RX MED GY IP 250 OP 250 PS 637: Performed by: STUDENT IN AN ORGANIZED HEALTH CARE EDUCATION/TRAINING PROGRAM

## 2024-08-07 PROCEDURE — 258N000003 HC RX IP 258 OP 636: Performed by: STUDENT IN AN ORGANIZED HEALTH CARE EDUCATION/TRAINING PROGRAM

## 2024-08-07 PROCEDURE — 96361 HYDRATE IV INFUSION ADD-ON: CPT

## 2024-08-07 PROCEDURE — 84484 ASSAY OF TROPONIN QUANT: CPT | Performed by: STUDENT IN AN ORGANIZED HEALTH CARE EDUCATION/TRAINING PROGRAM

## 2024-08-07 PROCEDURE — 36415 COLL VENOUS BLD VENIPUNCTURE: CPT | Performed by: STUDENT IN AN ORGANIZED HEALTH CARE EDUCATION/TRAINING PROGRAM

## 2024-08-07 RX ORDER — METOPROLOL SUCCINATE 50 MG/1
50 TABLET, EXTENDED RELEASE ORAL ONCE
Status: COMPLETED | OUTPATIENT
Start: 2024-08-07 | End: 2024-08-07

## 2024-08-07 RX ADMIN — METOPROLOL SUCCINATE 50 MG: 50 TABLET, EXTENDED RELEASE ORAL at 01:51

## 2024-08-07 RX ADMIN — SODIUM CHLORIDE 1000 ML: 9 INJECTION, SOLUTION INTRAVENOUS at 01:22

## 2024-08-07 ASSESSMENT — ACTIVITIES OF DAILY LIVING (ADL)
ADLS_ACUITY_SCORE: 35
ADLS_ACUITY_SCORE: 35

## 2024-08-07 NOTE — ED NOTES
Pt ambulated without assistance. Denied any lightheaded/dizziness, CP or SOB. Pt states he feels fine. HR remains up to 120 with activity. Provider updated.

## 2024-08-07 NOTE — ED PROVIDER NOTES
EMERGENCY DEPARTMENT ENCOUNTER      NAME: Rj Pink  AGE: 64 year old male  YOB: 1960  MRN: [id number]  EVALUATION DATE & TIME: 8/6/2024 10:16 PM    PCP: Ryne  [place], [facility]    ED PROVIDER: David Robertson MD      Chief Complaint   Patient presents with    Near Syncope    Hypotension         FINAL IMPRESSION:  1. Dehydration    2. KENNETH (acute kidney injury) (H24)    3. Hemangioma of intra-abdominal structure          ED COURSE & MEDICAL DECISION MAKING:    Pertinent Labs & Imaging studies reviewed. (See chart for details)  64 year old male presents to the Emergency Department for evaluation of near syncope    ED Course as of 08/07/24 0246 Tue Aug 06, 2024   2224 EKG is sinus tachycardia at a rate of 119 with a left anterior fascicular block, no ST segment changes indicative of emergent ischemia.  No change in EKG when compared to his prior in 2022.   2253 Patient is a 64-year-old male with a past medical history significant for coronary artery disease who presents the emergency department with near syncope while mowing the lawn in addition to pain at his right shoulder blade.  Currently is asymptomatic as he is lying in bed but is tachycardic in the 110's with mildly low blood pressures with systolics in the 90s after receiving 700 cc of fluid by EMS.  Differential diagnosis includes ACS, pulmonary embolism, aortic dissection.  He has no abdominal tenderness to suspect referred pain from biliary pathology.  Of note, the patient did initially tell nursing staff that he had numbness/tingling in his left arm for several seconds when this near syncopal event occurred, but he denies this when asked by me, and he has an NIH stroke scale of 0, and no neurologic complaints at this time.   2309 No anemia or leukocytosis.   2315 Patient has a new KENNETH.  His last chemistry panel was February of this year which had a creatinine of 1.28 and a GFR of greater than 60.  Currently it  is 2.03 creatinine and a GFR of 36.  Mildly hypoglycemic at 68, will let the patient eat something now.  No electrolyte abnormalities.   2315 Initial troponin is 19, will trend at the 2-hour.   2319 Discussed with [facility] regarding the ddx including both aortic pathology (his pain radiating to back) and PE (near syncope with tachycardia); they recommended CTA chest/abd and they will try to catch pulmonary artery phase as well.   Wed Aug 07, 2024   0034 Fortunately the CTA does not show evidence of aortic pathology or pulmonary embolism.  There is a small lesion in the patient's liver which is consistent with hemangioma and can have a nonemergent ultrasound for follow-up.   0109 Point of care ultrasound obtained to evaluate for pericardial effusion, and to assess volume status with evaluation of his IVC.  After 2 L of fluids, his blood pressure is improved, but he is still tachycardic, so this test was performed.  His IVC is thin, less than 1 cm, collapsible, with respiratory variation.  These findings are indicative that the patient needs more fluids, so we will order another liter at this time.   0117 Fortunately troponin is unchanged from prior.   0124 [place] syncope risk calculator: low risk for serious outcome   0129 Repeat EKG is sinus tachycardia at a rate of 116 with left axis deviation.  Again similar appearance to prior EKG today as well as his last EKG in 2022.  There are no acute changes of his ST segments that indicate emergent ischemia.  Furthermore, there is no arrhythmia as P waves are present.   0146 Patient's blood pressure has improved, and is now normal.  Ordered for his home dose of 50 mg Toprol-XL, which he has not taken tonight.   0221 Heart rate is now improved at 92, normotensive.  Will ambulate now.   0235 Patient ambulated without any symptoms.  Reassessed, no evidence of volume overload on exam without crackles at the lung bases, without pitting edema in the lower extremities.   Patient's heart rate returned up to 118.  I had a discussion with the patient, and his family members regarding the possibility of admission for observation given his persistent tachycardia, despite volume resuscitation, and his increase in creatinine.  Through shared decision making, ultimately the patient and family members felt comfortable with him going home and having close follow-up with his family med doctor and his cardiologist for repeat of his creatinine in addition to echocardiogram.  Will discharge at this time with return precautions.       10:40 PM I met the patient and performed my initial interview and exam.   1:26 AM Rechecked and updated patient.   1:42 AM Rechecked patient   2:34 AM Rechecked and updated patient. We discussed plan for discharge and all questions were answered.     Medical Decision Making  Obtained supplemental history:Supplemental history obtained?: Documented in chart and Family Member/Significant Other  Reviewed external records: External records reviewed?: No  Care impacted by chronic illness:Diabetes, Heart Disease, Hypertension, and Other: NSTEMI  Care significantly affected by social determinants of health:N/A  Did you consider but not order tests?: Work up considered but not performed and documented in chart, if applicable  Did you interpret images independently?: Independent interpretation of ECG and images noted in documentation, when applicable.  Consultation discussion with other provider:Did you involve another provider (consultant, , pharmacy, etc.)?: No  Discharge. No recommendations on prescription strength medication(s). I considered admission, but discharged the patient after share decision making conversation.        At the conclusion of the encounter I discussed the results of all of the tests and the disposition. The questions were answered. The patient or family acknowledged understanding and was agreeable with the care plan.     0 minutes of critical care  time     MEDICATIONS GIVEN IN THE EMERGENCY:  Medications   sodium chloride 0.9% BOLUS 1,000 mL (0 mLs Intravenous Stopped 8/7/24 0054)   iopamidol (ISOVUE-370) solution 75 mL (75 mLs Intravenous $Given 8/6/24 4389)   sodium chloride 0.9% BOLUS 1,000 mL (0 mLs Intravenous Stopped 8/7/24 0240)   metoprolol succinate ER (TOPROL XL) 24 hr tablet 50 mg (50 mg Oral $Given 8/7/24 0151)       NEW PRESCRIPTIONS STARTED AT TODAY'S ER VISIT  New Prescriptions    No medications on file          =================================================================    HPI    Patient information was obtained from: patient    Use of : N/A       Rj Pink is a 64 year old male with a pertinent history of CAD, hypertension, NSTEMI, diabetes mellitus type 2, who presents to this ED via EMS for evaluation of near syncope and hypotension.    At about 7:30 PM, the patient was mowing his lawn when he developed right shoulder blade pain that has since been constant, then having a near syncopal episode with no actual loss of consciousness. He developed a headache at that time which has since resolved, as well as left arm weakness that went away after a few minutes and hasn't returned since. He began feeling fatigued as well and went inside his home, then noting his vision darkening when he was sitting down. His blood pressure was read and was noted to be lower than normal, prompting presentation to the ER.    The patient has a history of diabetes mellitus type 2, currently on Ozempic, no insulin use. He also has a history of CAD with 2 stent placements.     The patient denies melena, cough, fever, hematochezia, 1-sided numbness, double vision, or peripheral vision issues.      PAST MEDICAL HISTORY:  Past Medical History:   Diagnosis Date    Chest pain     Coronary artery disease 11/13/2015    Diabetes mellitus, type II (H)     Hypertension     Kidney stone     Myocardial infarction (H)     NSTEMI 2014       PAST SURGICAL  HISTORY:  Past Surgical History:   Procedure Laterality Date    CARDIAC CATHETERIZATION  10/27/15    CORONARY STENT PLACEMENT  2014    CV CORONARY ANGIOGRAM N/A 9/21/2017    Procedure: Coronary Angiogram;  Surgeon: Claribel Gaspar MD;  Location: [facility] Cath Lab;  Service:     CV LEFT HEART CATHETERIZATION WITH LEFT VENTRICULOGRAM N/A 9/21/2017    Procedure: Left Heart Catheterization with Left Ventriculogram;  Surgeon: Claribel Gaspar MD;  Location: [facility] Cath Lab;  Service:     OTHER SURGICAL HISTORY      kidney stone removal           CURRENT MEDICATIONS:    aspirin 81 MG EC tablet  atorvastatin (LIPITOR) 80 MG tablet  Easy Touch Lancets 28G MISC  insulin glargine (LANTUS) 100 unit/mL injection  JARDIANCE 25 MG TABS tablet  LANTUS SOLOSTAR 100 UNIT/ML soln  losartan (COZAAR) 100 MG tablet  metFORMIN (GLUCOPHAGE XR) 500 MG 24 hr tablet  metoprolol succinate ER (TOPROL XL) 50 MG 24 hr tablet  nitroGLYcerin (NITROSTAT) 0.4 MG sublingual tablet  semaglutide (OZEMPIC) 2 MG/3ML pen        ALLERGIES:  Allergies   Allergen Reactions    Lisinopril Cough       FAMILY HISTORY:  Family History   Problem Relation Age of Onset    Hyperlipidemia Mother     Hypertension Mother     Diabetes Mother     Hyperlipidemia Father     Hypertension Father     Diabetes Father     Cerebrovascular Disease Brother 46    Coronary Artery Disease No family hx of        SOCIAL HISTORY:   Social History     Socioeconomic History    Marital status:    Tobacco Use    Smoking status: Never    Smokeless tobacco: Never   Vaping Use    Vaping status: Never Used   Substance and Sexual Activity    Alcohol use: Yes     Comment: Alcoholic Drinks/day: once in a few months    Drug use: No    Sexual activity: Yes     Partners: Female     Social Determinants of Health     Financial Resource Strain: Low Risk  (3/26/2024)    Financial Resource Strain     Within the past 12 months, have you or your family members you live with been unable to get  "utilities (heat, electricity) when it was really needed?: No   Food Insecurity: Low Risk  (3/26/2024)    Food Insecurity     Within the past 12 months, did you worry that your food would run out before you got money to buy more?: No     Within the past 12 months, did the food you bought just not last and you didn t have money to get more?: No   Transportation Needs: Low Risk  (3/26/2024)    Transportation Needs     Within the past 12 months, has lack of transportation kept you from medical appointments, getting your medicines, non-medical meetings or appointments, work, or from getting things that you need?: No   Physical Activity: Insufficiently Active (3/26/2024)    Exercise Vital Sign     Days of Exercise per Week: 4 days     Minutes of Exercise per Session: 30 min   Stress: No Stress Concern Present (3/26/2024)    [employer] [place] [employer] - Occupational Stress Questionnaire     Feeling of Stress : Not at all   Social Connections: Unknown (3/26/2024)    Social Connection and Isolation Panel [NHANES]     Frequency of Social Gatherings with Friends and Family: Patient declined   Interpersonal Safety: Low Risk  (3/26/2024)    Interpersonal Safety     Do you feel physically and emotionally safe where you currently live?: Yes     Within the past 12 months, have you been hit, slapped, kicked or otherwise physically hurt by someone?: No     Within the past 12 months, have you been humiliated or emotionally abused in other ways by your partner or ex-partner?: No   Housing Stability: High Risk (3/26/2024)    Housing Stability     Do you have housing? : No     Are you worried about losing your housing?: No       VITALS:  /83   Pulse (!) 123   Temp 97.7  F (36.5  C)   Resp 24   Ht 1.549 m (5' 1\")   Wt 82.1 kg (181 lb)   SpO2 100%   BMI 34.20 kg/m      PHYSICAL EXAM    Physical Exam  Vitals and nursing note reviewed.   Constitutional:       General: He is not in acute distress.     Appearance: " Normal appearance. He is normal weight. He is not ill-appearing.   HENT:      Head: Normocephalic and atraumatic.      Nose: Nose normal.      Mouth/Throat:      Mouth: Mucous membranes are moist.      Pharynx: Oropharynx is clear.   Eyes:      Extraocular Movements: Extraocular movements intact.      Conjunctiva/sclera: Conjunctivae normal.      Pupils: Pupils are equal, round, and reactive to light.   Cardiovascular:      Rate and Rhythm: Regular rhythm. Tachycardia present.      Pulses: Normal pulses.      Heart sounds: Normal heart sounds. No murmur heard.  Pulmonary:      Effort: Pulmonary effort is normal. No respiratory distress.      Breath sounds: Normal breath sounds.   Abdominal:      General: Abdomen is flat. There is no distension.      Palpations: Abdomen is soft.      Tenderness: There is no abdominal tenderness.   Musculoskeletal:         General: Normal range of motion.      Cervical back: Normal range of motion.      Right lower leg: No edema.      Left lower leg: No edema.   Skin:     General: Skin is warm and dry.      Capillary Refill: Capillary refill takes less than 2 seconds.   Neurological:      General: No focal deficit present.      Mental Status: He is alert and oriented to person, place, and time. Mental status is at baseline.      Cranial Nerves: No cranial nerve deficit.      Sensory: No sensory deficit.      Motor: No weakness.      Coordination: Coordination normal.      Comments: NIHSS 0   Psychiatric:         Mood and Affect: Mood normal.         Behavior: Behavior normal.         Thought Content: Thought content normal.         Judgment: Judgment normal.            LAB:  All pertinent labs reviewed and interpreted.  Results for orders placed or performed during the hospital encounter of 08/06/24   CTA Chest Abdomen Pelvis w Contrast    Impression    IMPRESSION:  1.  No evidence for thoracic or abdominal aortic dissection or aneurysm. No evidence for hemodynamically significant  stenosis.  2.  No evidence for central or central subsegmental pulmonary embolism.  3.  1.3 cm peripherally enhancing focus in the right posterior liver has an appearance most consistent with a hemangioma. Recommend nonemergent ultrasound to confirm.  4.  Moderate thoracic spondylosis with mild lumbar spondylosis.     POC US ECHO LIMITED    Impression    PROCEDURE:    Emergency Department Limited Bedside Screening Cardiac Ultrasound  INDICATIONS: Tachycardia, hypotension  PROCEDURE PROVIDER: David Robertson   WINDOW AND FINDINGS:   SUB-XYPHOID     :      Cardiac activity: Normal  Pericardial effusion: No clinically significant pericardial effusion  Signs of Tamponade physiology: Absent    IVC: collapsible, respiratory variation present, <1cm    PARASTERNAL    :  Cardiac activity: Normal  Pericardial effusion: No clinically significant pericardial effusion  Signs of Tamponade physiology: Absent    IMAGES SAVED AND STORED FOR ARCHIVE AND REVIEW: Yes        Basic metabolic panel   Result Value Ref Range    Sodium 144 135 - 145 mmol/L    Potassium 4.1 3.4 - 5.3 mmol/L    Chloride 108 (H) 98 - 107 mmol/L    Carbon Dioxide (CO2) 25 22 - 29 mmol/L    Anion Gap 11 7 - 15 mmol/L    Urea Nitrogen 34.2 (H) 8.0 - 23.0 mg/dL    Creatinine 2.03 (H) 0.67 - 1.17 mg/dL    GFR Estimate 36 (L) >60 mL/min/1.73m2    Calcium 9.1 8.8 - 10.4 mg/dL    Glucose 68 (L) 70 - 99 mg/dL   Result Value Ref Range    Troponin T, High Sensitivity 19 <=22 ng/L   CBC with platelets and differential   Result Value Ref Range    WBC Count 9.1 4.0 - 11.0 10e3/uL    RBC Count 4.98 4.40 - 5.90 10e6/uL    Hemoglobin 14.5 13.3 - 17.7 g/dL    Hematocrit 44.3 40.0 - 53.0 %    MCV 89 78 - 100 fL    MCH 29.1 26.5 - 33.0 pg    MCHC 32.7 31.5 - 36.5 g/dL    RDW 13.4 10.0 - 15.0 %    Platelet Count 162 150 - 450 10e3/uL    % Neutrophils 60 %    % Lymphocytes 25 %    % Monocytes 7 %    % Eosinophils 6 %    % Basophils 1 %    % Immature Granulocytes 0 %    NRBCs per  100 WBC 0 <1 /100    Absolute Neutrophils 5.5 1.6 - 8.3 10e3/uL    Absolute Lymphocytes 2.3 0.8 - 5.3 10e3/uL    Absolute Monocytes 0.7 0.0 - 1.3 10e3/uL    Absolute Eosinophils 0.5 0.0 - 0.7 10e3/uL    Absolute Basophils 0.1 0.0 - 0.2 10e3/uL    Absolute Immature Granulocytes 0.0 <=0.4 10e3/uL    Absolute NRBCs 0.0 10e3/uL   Result Value Ref Range    Troponin T, High Sensitivity 18 <=22 ng/L       RADIOLOGY:  Reviewed all pertinent imaging. Please see official radiology report.  POC US ECHO LIMITED   ED Interpretation   PROCEDURE:    Emergency Department Limited Bedside Screening Cardiac Ultrasound  INDICATIONS: Tachycardia, hypotension  PROCEDURE PROVIDER: David Robertson   WINDOW AND FINDINGS:   SUB-XYPHOID     :      Cardiac activity: Normal  Pericardial effusion: No clinically significant pericardial effusion  Signs of Tamponade physiology: Absent    IVC: collapsible, respiratory variation present, <1cm    PARASTERNAL    :  Cardiac activity: Normal  Pericardial effusion: No clinically significant pericardial effusion  Signs of Tamponade physiology: Absent    IMAGES SAVED AND STORED FOR ARCHIVE AND REVIEW: Yes           CTA Chest Abdomen Pelvis w Contrast   Final Result   IMPRESSION:   1.  No evidence for thoracic or abdominal aortic dissection or aneurysm. No evidence for hemodynamically significant stenosis.   2.  No evidence for central or central subsegmental pulmonary embolism.   3.  1.3 cm peripherally enhancing focus in the right posterior liver has an appearance most consistent with a hemangioma. Recommend nonemergent ultrasound to confirm.   4.  Moderate thoracic spondylosis with mild lumbar spondylosis.             PROCEDURES:   None      [facility] [facility] Documentation:   CMS Diagnoses:               IRoby, am serving as a scribe to document services personally performed by David Robertson MD based on my observation and the provider's statements to me. I, David Robertson MD,  attest that Roby Conde is acting in a scribe capacity, has observed my performance of the services and has documented them in accordance with my direction.    David Robertson MD  [facility] EMERGENCY DEPARTMENT  [address] [place] [id number] [phone]       David Robertson MD  08/07/24 0246

## 2024-08-07 NOTE — TELEPHONE ENCOUNTER
Needs refill on ozempic, unsure dosing.       Has appt scheduled for 8/16.        Carol REGALADO  Station

## 2024-08-07 NOTE — ED NOTES
Bed: [place] [address]  Expected date:   Expected time:   Means of arrival:   Comments:  [facility]- near syncope, hypotensive

## 2024-08-07 NOTE — ED TRIAGE NOTES
Pt coming from home. Today Pt has noted feeling like his vision is going black when he is mowing the lawn and changing position from sitting to standing. One episode Pt noted his left arm felt numb and he had a head ache across his forehead. This went away after several seconds. Pt denies these symptoms now.  and found Pt's BP to be in the 80's systolic. EMS placed an IV, did a 12 lead and gave 900 ml NS. .      Triage Assessment (Adult)       Row Name 08/06/24 1156          Triage Assessment    Airway WDL WDL        Respiratory WDL    Respiratory WDL WDL        Skin Circulation/Temperature WDL    Skin Circulation/Temperature WDL WDL        Cardiac WDL    Cardiac WDL rhythm     Pulse Rate & Regularity tachycardic     Cardiac Rhythm ST        Peripheral/Neurovascular WDL    Peripheral Neurovascular WDL WDL        Cognitive/Neuro/Behavioral WDL    Cognitive/Neuro/Behavioral WDL --  Light headed.

## 2024-08-07 NOTE — DISCHARGE INSTRUCTIONS
Because you had an episode where you almost passed out tonight, and you have a history of coronary artery disease, and you have not had a formal echocardiogram in 4 years, you should discuss with your primary care and/or your cardiologist to have a repeat of your echocardiogram as soon as possible.    You will also need to have your kidney function labs rechecked within a couple days as they were elevated here in the ER, which can happen with dehydration.    Please return to the emergency department if your symptoms worsen, if you develop chest pain, difficulty breathing, lightheadedness or passing out.

## 2024-08-08 NOTE — TELEPHONE ENCOUNTER
Pt was called & read providers message. Pt verbalized understanding & appt scheduled for 8/29 for both hosp follow up & also diabetes check.    Ana Paula Nation RN

## 2024-08-08 NOTE — TELEPHONE ENCOUNTER
Called pt. Pt asking if needs separate appt for diabetic check/meds.  He finished the Ozempic 0.5mg dose on Sat 8/3. Pt is now out of medication.  Pt has tolerated this dose.  Pt has upcoming appt sched for 8/16 for ER  follow up. Was in ER 8/6 for near syncope, hypotension     Routing to PCP: Elda, pt is due for diabetic check & meds. Would you like pt to schedule a separate appt with you for this & ok to use same day appt?    Ana Paula Nation RN

## 2024-08-08 NOTE — TELEPHONE ENCOUNTER
I will refill his ozempic for 1 month at current dose.  He can make a hospital follow-up and I will address diabetes also.  Elda Cheung NP on 8/8/2024 at 3:11 PM

## 2024-08-09 LAB
ATRIAL RATE - MUSE: 116 BPM
ATRIAL RATE - MUSE: 119 BPM
DIASTOLIC BLOOD PRESSURE - MUSE: 65 MMHG
DIASTOLIC BLOOD PRESSURE - MUSE: 69 MMHG
INTERPRETATION ECG - MUSE: NORMAL
INTERPRETATION ECG - MUSE: NORMAL
P AXIS - MUSE: NORMAL DEGREES
P AXIS - MUSE: NORMAL DEGREES
PR INTERVAL - MUSE: 208 MS
PR INTERVAL - MUSE: 208 MS
QRS DURATION - MUSE: 96 MS
QRS DURATION - MUSE: 98 MS
QT - MUSE: 304 MS
QT - MUSE: 328 MS
QTC - MUSE: 427 MS
QTC - MUSE: 455 MS
R AXIS - MUSE: -42 DEGREES
R AXIS - MUSE: -54 DEGREES
SYSTOLIC BLOOD PRESSURE - MUSE: 105 MMHG
SYSTOLIC BLOOD PRESSURE - MUSE: 98 MMHG
T AXIS - MUSE: 36 DEGREES
T AXIS - MUSE: 53 DEGREES
VENTRICULAR RATE- MUSE: 116 BPM
VENTRICULAR RATE- MUSE: 119 BPM

## 2024-08-12 DIAGNOSIS — E11.9 CONTROLLED TYPE 2 DIABETES MELLITUS WITHOUT COMPLICATION, WITH LONG-TERM CURRENT USE OF INSULIN (H): ICD-10-CM

## 2024-08-12 DIAGNOSIS — Z79.4 CONTROLLED TYPE 2 DIABETES MELLITUS WITHOUT COMPLICATION, WITH LONG-TERM CURRENT USE OF INSULIN (H): ICD-10-CM

## 2024-08-13 RX ORDER — EMPAGLIFLOZIN 25 MG/1
25 TABLET, FILM COATED ORAL DAILY
Qty: 90 TABLET | Refills: 0 | Status: SHIPPED | OUTPATIENT
Start: 2024-08-13 | End: 2024-08-29

## 2024-08-13 RX ORDER — METFORMIN HCL 500 MG
1000 TABLET, EXTENDED RELEASE 24 HR ORAL 2 TIMES DAILY WITH MEALS
Qty: 360 TABLET | Refills: 0 | Status: SHIPPED | OUTPATIENT
Start: 2024-08-13 | End: 2024-08-29

## 2024-08-29 ENCOUNTER — OFFICE VISIT (OUTPATIENT)
Dept: FAMILY MEDICINE | Facility: CLINIC | Age: 64
End: 2024-08-29
Payer: COMMERCIAL

## 2024-08-29 VITALS
DIASTOLIC BLOOD PRESSURE: 78 MMHG | HEART RATE: 89 BPM | RESPIRATION RATE: 16 BRPM | SYSTOLIC BLOOD PRESSURE: 132 MMHG | WEIGHT: 171.2 LBS | HEIGHT: 62 IN | TEMPERATURE: 97.6 F | BODY MASS INDEX: 31.5 KG/M2 | OXYGEN SATURATION: 99 %

## 2024-08-29 DIAGNOSIS — E11.9 CONTROLLED TYPE 2 DIABETES MELLITUS WITHOUT COMPLICATION, WITHOUT LONG-TERM CURRENT USE OF INSULIN (H): Primary | ICD-10-CM

## 2024-08-29 DIAGNOSIS — N17.9 AKI (ACUTE KIDNEY INJURY) (H): ICD-10-CM

## 2024-08-29 DIAGNOSIS — I10 ESSENTIAL HYPERTENSION: ICD-10-CM

## 2024-08-29 DIAGNOSIS — E11.9 CONTROLLED TYPE 2 DIABETES MELLITUS WITHOUT COMPLICATION, WITH LONG-TERM CURRENT USE OF INSULIN (H): ICD-10-CM

## 2024-08-29 DIAGNOSIS — Z79.4 CONTROLLED TYPE 2 DIABETES MELLITUS WITHOUT COMPLICATION, WITH LONG-TERM CURRENT USE OF INSULIN (H): ICD-10-CM

## 2024-08-29 DIAGNOSIS — I25.10 CORONARY ARTERY DISEASE INVOLVING NATIVE CORONARY ARTERY OF NATIVE HEART WITHOUT ANGINA PECTORIS: ICD-10-CM

## 2024-08-29 LAB
ANION GAP SERPL CALCULATED.3IONS-SCNC: 10 MMOL/L (ref 7–15)
BUN SERPL-MCNC: 21.3 MG/DL (ref 8–23)
CALCIUM SERPL-MCNC: 9.2 MG/DL (ref 8.8–10.4)
CHLORIDE SERPL-SCNC: 101 MMOL/L (ref 98–107)
CHOLEST SERPL-MCNC: 80 MG/DL
CREAT SERPL-MCNC: 1.22 MG/DL (ref 0.67–1.17)
CREAT UR-MCNC: 46.7 MG/DL
EGFRCR SERPLBLD CKD-EPI 2021: 67 ML/MIN/1.73M2
FASTING STATUS PATIENT QL REPORTED: YES
FASTING STATUS PATIENT QL REPORTED: YES
GLUCOSE SERPL-MCNC: 129 MG/DL (ref 70–99)
HBA1C MFR BLD: 6.8 % (ref 0–5.6)
HCO3 SERPL-SCNC: 28 MMOL/L (ref 22–29)
HDLC SERPL-MCNC: 23 MG/DL
LDLC SERPL CALC-MCNC: 19 MG/DL
MICROALBUMIN UR-MCNC: 390 MG/L
MICROALBUMIN/CREAT UR: 835.12 MG/G CR (ref 0–17)
NONHDLC SERPL-MCNC: 57 MG/DL
POTASSIUM SERPL-SCNC: 5 MMOL/L (ref 3.4–5.3)
SODIUM SERPL-SCNC: 139 MMOL/L (ref 135–145)
TRIGL SERPL-MCNC: 189 MG/DL

## 2024-08-29 PROCEDURE — 82043 UR ALBUMIN QUANTITATIVE: CPT | Performed by: NURSE PRACTITIONER

## 2024-08-29 PROCEDURE — 80061 LIPID PANEL: CPT | Performed by: NURSE PRACTITIONER

## 2024-08-29 PROCEDURE — 83036 HEMOGLOBIN GLYCOSYLATED A1C: CPT | Performed by: NURSE PRACTITIONER

## 2024-08-29 PROCEDURE — 99214 OFFICE O/P EST MOD 30 MIN: CPT | Performed by: NURSE PRACTITIONER

## 2024-08-29 PROCEDURE — G2211 COMPLEX E/M VISIT ADD ON: HCPCS | Performed by: NURSE PRACTITIONER

## 2024-08-29 PROCEDURE — 36415 COLL VENOUS BLD VENIPUNCTURE: CPT | Performed by: NURSE PRACTITIONER

## 2024-08-29 PROCEDURE — 80048 BASIC METABOLIC PNL TOTAL CA: CPT | Performed by: NURSE PRACTITIONER

## 2024-08-29 PROCEDURE — 82570 ASSAY OF URINE CREATININE: CPT | Performed by: NURSE PRACTITIONER

## 2024-08-29 ASSESSMENT — PAIN SCALES - GENERAL: PAINLEVEL: NO PAIN (0)

## 2024-08-29 NOTE — LETTER
August 29, 2024      Rj Pink  [address] [facility] [address]        Dear ,    We are writing to inform you of your test results.    Your urine does show some protein dumping but your kidney function is improving.  Your cholesterol is stable.  Plan to keep your medications the same as you are taking now.  Schedule appointment for recheck in 6 months.     Resulted Orders   Albumin Random Urine Quantitative with Creat Ratio   Result Value Ref Range    Creatinine Urine mg/dL 46.7 mg/dL      Comment:      The reference ranges have not been established in urine creatinine. The results should be integrated into the clinical context for interpretation.    Albumin Urine mg/L 390.0 mg/L      Comment:      The reference ranges have not been established in urine albumin. The results should be integrated into the clinical context for interpretation.    Albumin Urine mg/g Cr 835.12 (H) 0.00 - 17.00 mg/g Cr      Comment:      Microalbuminuria is defined as an albumin:creatinine ratio of 17 to 299 for males and 25 to 299 for females. A ratio of albumin:creatinine of 300 or higher is indicative of overt proteinuria.  Due to biologic variability, positive results should be confirmed by a second, first-morning random or 24-hour timed urine specimen. If there is discrepancy, a third specimen is recommended. When 2 out of 3 results are in the microalbuminuria range, this is evidence for incipient nephropathy and warrants increased efforts at glucose control, blood pressure control, and institution of therapy with an angiotensin-converting-enzyme (ACE) inhibitor (if the patient can tolerate it).     BASIC METABOLIC PANEL   Result Value Ref Range    Sodium 139 135 - 145 mmol/L    Potassium 5.0 3.4 - 5.3 mmol/L    Chloride 101 98 - 107 mmol/L    Carbon Dioxide (CO2) 28 22 - 29 mmol/L    Anion Gap 10 7 - 15 mmol/L    Urea Nitrogen 21.3 8.0 - 23.0 mg/dL    Creatinine 1.22 (H) 0.67 - 1.17 mg/dL    GFR Estimate 67 >60  mL/min/1.73m2      Comment:      eGFR calculated using 2021 CKD-EPI equation.    Calcium 9.2 8.8 - 10.4 mg/dL      Comment:      Reference intervals for this test were updated on 7/16/2024 to reflect our healthy population more accurately. There may be differences in the flagging of prior results with similar values performed with this method. Those prior results can be interpreted in the context of the updated reference intervals.    Glucose 129 (H) 70 - 99 mg/dL    Patient Fasting > 8hrs? Yes       Comment:      Fasting since 1930 on 089/28/2024  Fasting since 1930 on 089/28/2024  Fasting since 1930 on 089/28/2024   HEMOGLOBIN A1C   Result Value Ref Range    Hemoglobin A1C 6.8 (H) 0.0 - 5.6 %      Comment:      Normal <5.7%   Prediabetes 5.7-6.4%    Diabetes 6.5% or higher     Note: Adopted from ADA consensus guidelines.   Lipid panel reflex to direct LDL Fasting   Result Value Ref Range    Cholesterol 80 <200 mg/dL    Triglycerides 189 (H) <150 mg/dL    Direct Measure HDL 23 (L) >=40 mg/dL    LDL Cholesterol Calculated 19 <=100 mg/dL    Non HDL Cholesterol 57 <130 mg/dL    Patient Fasting > 8hrs? Yes       Comment:      Fasting since 1930 on 089/28/2024  Fasting since 1930 on 089/28/2024  Fasting since 1930 on 089/28/2024    Narrative    Cholesterol  Desirable:  <200 mg/dL    Triglycerides  Normal:  Less than 150 mg/dL  Borderline High:  150-199 mg/dL  High:  200-499 mg/dL  Very High:  Greater than or equal to 500 mg/dL    Direct Measure HDL  Female:  Greater than or equal to 50 mg/dL   Male:  Greater than or equal to 40 mg/dL    LDL Cholesterol  Desirable:  <100mg/dL  Above Desirable:  100-129 mg/dL   Borderline High:  130-159 mg/dL   High:  160-189 mg/dL   Very High:  >= 190 mg/dL    Non HDL Cholesterol  Desirable:  130 mg/dL  Above Desirable:  130-159 mg/dL  Borderline High:  160-189 mg/dL  High:  190-219 mg/dL  Very High:  Greater than or equal to 220 mg/dL       If you have any questions or concerns, please  call the clinic at the number listed above.       Sincerely,      Elda Cheung NP

## 2024-08-29 NOTE — PATIENT INSTRUCTIONS
A1C, BMP (electrolytes and kidney function), urine albumin and cholesterol testing today.  Get eye exam completed here at [facility] or another facility but bring in the report if outside of our facility exam is done.  Plan to continue medications for BP (metoprolol) as you are taking them now..  I will notify you of labs and if I am going to increase your ozempic or remain on current dose, or add back a medication if needed.  If A1C is below 7%, plan to follow-up in 6 months for recheck.

## 2024-08-29 NOTE — LETTER
August 29, 2024      Rj Pink  [address] [facility] [address]        Dear ,    We are writing to inform you of your test results.    Your A1C is below 7% which is at goal.  Continue off the metformin and Jardiance.  I refilled your ozempic at current dose for the next 6 months.  I will notify you of the other results as they come back.     Resulted Orders   HEMOGLOBIN A1C   Result Value Ref Range    Hemoglobin A1C 6.8 (H) 0.0 - 5.6 %      Comment:      Normal <5.7%   Prediabetes 5.7-6.4%    Diabetes 6.5% or higher     Note: Adopted from ADA consensus guidelines.       If you have any questions or concerns, please call the clinic at the number listed above.       Sincerely,      Elda Cheung NP

## 2024-08-29 NOTE — PROGRESS NOTES
Assessment & Plan     Controlled type 2 diabetes mellitus without complication, without long-term current use of insulin (H)  Ordered A1c and urine albumin for evaluation of his diabetes.  Will hold metformin and Jardiance for now as well as the insulin due to A1c at 6.8%.  Continuing the [facility]pic at 0.5 mg [facility]  Plan to follow-up in 6 months for diabetic check.  - HEMOGLOBIN A1C; Future  - Albumin Random Urine Quantitative with Creat Ratio; Future    Coronary artery disease involving native coronary artery of native heart without angina pectoris  Ordered lipid for evaluation of cholesterol.  - Lipid panel reflex to direct LDL Fasting; Future    KENNETH (acute kidney injury) (H24)  BMP ordered for recheck on kidney function.  Also ordered urine albumin for evaluation of protein in the urine.  - Basic metabolic panel  (Ca, Cl, CO2, Creat, Gluc, K, Na, BUN); Future  - Albumin Random Urine Quantitative with Creat Ratio; Future      MED REC REQUIRED  Post Medication Reconciliation Status: discharge medications reconciled, continue medications without change    See Patient Instructions    Handy De La Rosa is a 64 year old, presenting for the following health issues:  Hospital F/U        8/29/2024     9:41 AM   Additional Questions   Roomed by rmb   Accompanied by self         8/29/2024     9:41 AM   Patient Reported Additional Medications   Patient reports taking the following new medications none     History of Present Illness       Reason for visit:  Follow up my [facility] visit and my [facility] health visited He is missing 1 dose(s) of medications per week.     ED/UC Followup:    Facility:  [facility]  Date of visit: 08/06/2024  Reason for visit: Dehydration   Current Status: discharged     Patient presents today for follow-up due to dehydration and acute kidney injury.  He was out cutting his lawn and started feeling tightness in his head and dizziness for about 2 to 3 minutes closed his eyes and  then he felt fine.  He went to the emergency room and kidney function had deteriorated.  He states that he is improved since his ER visit the beginning of the month.  At that time he stopped taking his Jardiance, metformin, losartan and insulin.  He is currently still taking his aspirin, metoprolol, atorvastatin and Ozempic.  His morning blood sugars have been between 95 and 115 when he checks.    Diabetes Follow-up    How often are you checking your blood sugar? One time daily  What time of day are you checking your blood sugars (select all that apply)?  Before meals  Have you had any blood sugars above 200?  No  Have you had any blood sugars below 70?  No  What symptoms do you notice when your blood sugar is low?  None  What concerns do you have today about your diabetes? None   Do you have any of these symptoms? (Select all that apply)  No numbness or tingling in feet.  No redness, sores or blisters on feet.  No complaints of excessive thirst.  No reports of blurry vision.  No significant changes to weight.  Have you had a diabetic eye exam in the last 12 months? No      Hyperlipidemia Follow-Up    Are you regularly taking any medication or supplement to lower your cholesterol?   Yes-    Are you having muscle aches or other side effects that you think could be caused by your cholesterol lowering medication?  No    Hypertension Follow-up    Do you check your blood pressure regularly outside of the clinic? Yes   Are you following a low salt diet? Yes  Are your blood pressures ever more than 140 on the top number (systolic) OR more   than 90 on the bottom number (diastolic), for example 140/90? Yes    BP Readings from Last 2 Encounters:   08/29/24 (!) 152/98   08/07/24 123/83     Hemoglobin A1C (%)   Date Value   06/30/2017 7.5 (H)   07/04/2014 9.2 (H)     LDL Cholesterol Calculated (mg/dL)   Date Value   07/28/2022 47   09/21/2017 36     LDL Cholesterol Direct (mg/dL)   Date Value   08/31/2012 59   04/25/2011 90  "    How many servings of fruits and vegetables do you eat daily?  2-3  On average, how many sweetened beverages do you drink each day (Examples: soda, juice, sweet tea, etc.  Do NOT count diet or artificially sweetened beverages)?   0  How many days per week do you exercise enough to make your heart beat faster? 7 days  a week  How many minutes a day do you exercise enough to make your heart beat faster? 30 - 60  How many days per week do you miss taking your medication? 0      Review of Systems  CONSTITUTIONAL: NEGATIVE for fever, chills, change in weight  RESP: NEGATIVE for significant cough or SOB  CV: NEGATIVE for chest pain, palpitations or peripheral edema  PSYCHIATRIC: NEGATIVE for changes in mood or affect  ROS otherwise negative      Objective    BP (!) 152/98   Pulse 89   Temp 97.6  F (36.4  C) (Tympanic)   Resp 16   Ht 1.575 m (5' 2\")   Wt 77.7 kg (171 lb 3.2 oz)   SpO2 99%   BMI 31.31 kg/m    Body mass index is 31.31 kg/m .  Physical Exam   GENERAL: alert and no distress  RESP: lungs clear to auscultation - no rales, rhonchi or wheezes  CV: regular rate and rhythm, normal S1 S2, no S3 or S4, no murmur, click or rub, no peripheral edema  PSYCH: mentation appears normal, affect normal/bright         Signed Electronically by: Elda Cheung NP    "

## 2024-12-04 DIAGNOSIS — Z79.4 CONTROLLED TYPE 2 DIABETES MELLITUS WITHOUT COMPLICATION, WITH LONG-TERM CURRENT USE OF INSULIN (H): ICD-10-CM

## 2024-12-04 DIAGNOSIS — E11.9 CONTROLLED TYPE 2 DIABETES MELLITUS WITHOUT COMPLICATION, WITH LONG-TERM CURRENT USE OF INSULIN (H): ICD-10-CM

## 2024-12-04 RX ORDER — EMPAGLIFLOZIN 25 MG/1
25 TABLET, FILM COATED ORAL DAILY
Qty: 90 TABLET | Refills: 0 | OUTPATIENT
Start: 2024-12-04

## 2024-12-17 ENCOUNTER — TELEPHONE (OUTPATIENT)
Dept: FAMILY MEDICINE | Facility: CLINIC | Age: 64
End: 2024-12-17
Payer: COMMERCIAL

## 2024-12-17 DIAGNOSIS — E11.9 CONTROLLED TYPE 2 DIABETES MELLITUS WITHOUT COMPLICATION, WITH LONG-TERM CURRENT USE OF INSULIN (H): Primary | ICD-10-CM

## 2024-12-17 DIAGNOSIS — Z79.4 CONTROLLED TYPE 2 DIABETES MELLITUS WITHOUT COMPLICATION, WITH LONG-TERM CURRENT USE OF INSULIN (H): Primary | ICD-10-CM

## 2024-12-17 NOTE — TELEPHONE ENCOUNTER
Patient is off the metformin but continuing the Jardiance, along with the ozempic.  Blood sugars have been around 110-120 and stable.  Refilled Jardiance.  Elda Cheung NP on 12/17/2024 at 1:51 PM

## 2024-12-17 NOTE — TELEPHONE ENCOUNTER
Medication Question or Refill        What medication are you calling about (include dose and sig)?: JARDIANCE 25 MG TABS tablet     Preferred Pharmacy:     [place] [address] [place] [address] [place] [address] [address] [facility] [address]  Phone: [phone] Fax: [phone]      Controlled Substance Agreement on file:   CSA -- Patient Level:    CSA: None found at the patient level.       Who prescribed the medication?: Schorn    Do you need a refill? Yes    Do you have any questions or concerns?  Yes: pt called for a refill on med. Let pt know it was discontinued on med list diue to therapy completed. Pt said this was not discussed with him and he is in need of it as he is currently out of med. Please advise.       Could we send this information to you in [employer] or would you prefer to receive a phone call?:   Patient would prefer a phone call   Okay to leave a detailed message?: Yes at Home number on file [phone] (home)

## 2024-12-23 ENCOUNTER — VIRTUAL VISIT (OUTPATIENT)
Dept: FAMILY MEDICINE | Facility: CLINIC | Age: 64
End: 2024-12-23
Payer: COMMERCIAL

## 2024-12-23 DIAGNOSIS — T75.3XXS MOTION SICKNESS, SEQUELA: Primary | ICD-10-CM

## 2024-12-23 PROCEDURE — 99213 OFFICE O/P EST LOW 20 MIN: CPT | Mod: 95 | Performed by: NURSE PRACTITIONER

## 2024-12-23 RX ORDER — SCOLOPAMINE TRANSDERMAL SYSTEM 1 MG/1
1 PATCH, EXTENDED RELEASE TRANSDERMAL
Qty: 3 PATCH | Refills: 0 | Status: SHIPPED | OUTPATIENT
Start: 2024-12-23

## 2024-12-23 NOTE — PATIENT INSTRUCTIONS
Apply scopolamine patch behind the ear.   Make sure that you wash your hands after applying with soap and water.  Remove after 72 hours (3 days) and reapply new patch.  If no symptoms after getting off the ship, you can remove patch once back on land.

## 2024-12-23 NOTE — PROGRESS NOTES
Rj is a 64 year old who is being evaluated via a billable video visit.    How would you like to obtain your AVS? MyChart  If the video visit is dropped, the invitation should be resent by: Text to cell phone: [phone]  Will anyone else be joining your video visit? No    Assessment & Plan     Motion sickness, sequela  Feel scopolamine patches.  Advised patient to apply behind the ear as instructed on the package 4 hours prior to shipping out and replacing every 3 days while on the cruise.  I have advised him also that he needs to wash his hands with soap and water to reduce any exposure of the medication to his eyes which can cause dilation.  I advised him that once he is at the ship having symptoms of motion sickness that he can remove the patch.  - scopolamine (TRANSDERM) 1 MG/3DAYS 72 hr patch; Place 1 patch over 72 hours onto the skin every 72 hours.    See Patient Instructions    Subjective   Rj is a 64 year old, presenting for the following health issues:  Medication Request        12/23/2024    10:34 AM   Additional Questions   Roomed by rmb   Accompanied by self         12/23/2024    10:34 AM   Patient Reported Additional Medications   Patient reports taking the following new medications none     HPI     Concern - Patient is traveling on a cruise and wants medication for motion sickness; Patient will be cruising for 7 days.    Review of Systems  CONSTITUTIONAL: NEGATIVE for fever, chills, change in weight  RESP: NEGATIVE for significant cough or SOB  CV: NEGATIVE for chest pain, palpitations or peripheral edema  PSYCHIATRIC: NEGATIVE for changes in mood or affect  ROS otherwise negative      Objective    Vitals - Patient Reported  Pain Score: No Pain (0)      Vitals:  No vitals were obtained today due to virtual visit.    Physical Exam   GENERAL: alert and no distress  EYES: Eyes grossly normal to inspection.  No discharge or erythema, or obvious scleral/conjunctival abnormalities.  RESP: No audible  wheeze, cough, or visible cyanosis.    SKIN: Visible skin clear. No significant rash, abnormal pigmentation or lesions.  NEURO: Cranial nerves grossly intact.  Mentation and speech appropriate for age.  PSYCH: Appropriate affect, tone, and pace of words        Video-Visit Details    Type of service:  Video Visit   Originating Location (pt. Location): Home  Distant Location (provider location):  On-site  Platform used for Video Visit: Kathryn  Signed Electronically by: Elda Cheung NP

## 2025-02-24 ENCOUNTER — PATIENT OUTREACH (OUTPATIENT)
Dept: CARE COORDINATION | Facility: CLINIC | Age: 65
End: 2025-02-24
Payer: COMMERCIAL

## 2025-03-02 ENCOUNTER — HEALTH MAINTENANCE LETTER (OUTPATIENT)
Age: 65
End: 2025-03-02

## 2025-03-06 ENCOUNTER — LAB (OUTPATIENT)
Dept: LAB | Facility: CLINIC | Age: 65
End: 2025-03-06
Payer: COMMERCIAL

## 2025-03-06 ENCOUNTER — TELEPHONE (OUTPATIENT)
Dept: FAMILY MEDICINE | Facility: CLINIC | Age: 65
End: 2025-03-06

## 2025-03-06 ENCOUNTER — VIRTUAL VISIT (OUTPATIENT)
Dept: FAMILY MEDICINE | Facility: CLINIC | Age: 65
End: 2025-03-06
Payer: COMMERCIAL

## 2025-03-06 ENCOUNTER — ORDERS ONLY (AUTO-RELEASED) (OUTPATIENT)
Dept: FAMILY MEDICINE | Facility: CLINIC | Age: 65
End: 2025-03-06

## 2025-03-06 DIAGNOSIS — Z11.4 SCREENING FOR HIV (HUMAN IMMUNODEFICIENCY VIRUS): ICD-10-CM

## 2025-03-06 DIAGNOSIS — Z11.59 NEED FOR HEPATITIS C SCREENING TEST: ICD-10-CM

## 2025-03-06 DIAGNOSIS — E11.9 CONTROLLED TYPE 2 DIABETES MELLITUS WITHOUT COMPLICATION, WITHOUT LONG-TERM CURRENT USE OF INSULIN (H): ICD-10-CM

## 2025-03-06 DIAGNOSIS — Z12.11 SCREEN FOR COLON CANCER: ICD-10-CM

## 2025-03-06 DIAGNOSIS — E11.9 DIABETES MELLITUS TYPE II, CONTROLLED (H): Primary | ICD-10-CM

## 2025-03-06 DIAGNOSIS — E78.5 HYPERLIPIDEMIA LDL GOAL <70: ICD-10-CM

## 2025-03-06 DIAGNOSIS — I10 ESSENTIAL HYPERTENSION: ICD-10-CM

## 2025-03-06 DIAGNOSIS — E11.59 TYPE 2 DIABETES MELLITUS WITH OTHER CIRCULATORY COMPLICATION, WITHOUT LONG-TERM CURRENT USE OF INSULIN (H): Primary | ICD-10-CM

## 2025-03-06 LAB
ALBUMIN SERPL BCG-MCNC: 4.3 G/DL (ref 3.5–5.2)
ALP SERPL-CCNC: 84 U/L (ref 40–150)
ALT SERPL W P-5'-P-CCNC: 65 U/L (ref 0–70)
ANION GAP SERPL CALCULATED.3IONS-SCNC: 8 MMOL/L (ref 7–15)
AST SERPL W P-5'-P-CCNC: 36 U/L (ref 0–45)
BILIRUB SERPL-MCNC: 0.8 MG/DL
BUN SERPL-MCNC: 24.9 MG/DL (ref 8–23)
CALCIUM SERPL-MCNC: 10.3 MG/DL (ref 8.8–10.4)
CHLORIDE SERPL-SCNC: 106 MMOL/L (ref 98–107)
CREAT SERPL-MCNC: 1.37 MG/DL (ref 0.67–1.17)
EGFRCR SERPLBLD CKD-EPI 2021: 58 ML/MIN/1.73M2
EST. AVERAGE GLUCOSE BLD GHB EST-MCNC: 194 MG/DL
GLUCOSE SERPL-MCNC: 121 MG/DL (ref 70–99)
HBA1C MFR BLD: 8.4 % (ref 0–5.6)
HCO3 SERPL-SCNC: 25 MMOL/L (ref 22–29)
HCV AB SERPL QL IA: NONREACTIVE
HIV 1+2 AB+HIV1 P24 AG SERPL QL IA: NONREACTIVE
POTASSIUM SERPL-SCNC: 5.2 MMOL/L (ref 3.4–5.3)
PROT SERPL-MCNC: 7.7 G/DL (ref 6.4–8.3)
SODIUM SERPL-SCNC: 139 MMOL/L (ref 135–145)

## 2025-03-06 RX ORDER — METOPROLOL SUCCINATE 50 MG/1
50 TABLET, EXTENDED RELEASE ORAL AT BEDTIME
Qty: 90 TABLET | Refills: 3 | Status: SHIPPED | OUTPATIENT
Start: 2025-03-06

## 2025-03-06 RX ORDER — ATORVASTATIN CALCIUM 80 MG/1
80 TABLET, FILM COATED ORAL EVERY EVENING
Qty: 90 TABLET | Refills: 11 | Status: SHIPPED | OUTPATIENT
Start: 2025-03-06

## 2025-03-06 NOTE — PROGRESS NOTES
"Rj is a 64 year old who is being evaluated via a billable video visit.    How would you like to obtain your AVS? MyChart  If the video visit is dropped, the invitation should be resent by: Text to cell phone: [phone]  Will anyone else be joining your video visit? No      Assessment & Plan      type 2 diabetes mellitus  with circulatory disordrrwithout long-term current use of insulin (H)  Pending   - Hemoglobin A1c; Future  - Comprehensive metabolic panel (BMP + Alb, Alk Phos, ALT, AST, Total. Bili, TP); Future  Has CAD -  stable   Hyperlipidemia LDL goal <70  Stable   - atorvastatin (LIPITOR) 80 MG tablet; Take 1 tablet (80 mg) by mouth every evening.    Essential hypertension  Has been controlled in the past   - metoprolol succinate ER (TOPROL XL) 50 MG 24 hr tablet; Take 1 tablet (50 mg) by mouth at bedtime.    Screen for colon cancer  Advised   - QUENTIN([employer]); Future          BMI  Estimated body mass index is 31.31 kg/m  as calculated from the following:    Height as of 8/29/24: 1.575 m (5' 2\").    Weight as of 8/29/24: 77.7 kg (171 lb 3.2 oz).   Weight management plan: Diabetic diet    Follow up 3 months    Subjective   Rj is a 64 year old, presenting for the following health issues:  No chief complaint on file.        3/6/2025     9:20 AM   Additional Questions   Roomed by Kate     History of Present Illness       Diabetes:   He presents for follow up of diabetes.  He is checking home blood glucose one time daily.   He checks blood glucose before meals.  Blood glucose is never over 200 and never under 70.  When his blood glucose is low, the patient is asymptomatic for confusion, blurred vision, lethargy and reports not feeling dizzy, shaky, or weak.   He has no concerns regarding his diabetes at this time.   He is not experiencing numbness or burning in feet, excessive thirst, blurry vision, weight changes or redness, sores or blisters on feet. The patient has had a diabetic eye exam in " the last 12 months. Eye exam performed on [facility]. Location of last eye exam 04/2024.        Hyperlipidemia:  He presents for follow up of hyperlipidemia.   He is taking medication to lower cholesterol. He is not having myalgia or other side effects to statin medications.    Hypertension: He presents for follow up of hypertension.  He does check blood pressure  regularly outside of the clinic. Outside blood pressures have been over 140/90. He does not follow a low salt diet.     He eats 0-1 servings of fruits and vegetables daily.He consumes 0 sweetened beverage(s) daily.He exercises with enough effort to increase his heart rate 9 or less minutes per day.  He exercises with enough effort to increase his heart rate 3 or less days per week.   He is taking medications regularly.                Review of Systems  Constitutional, neuro, ENT, endocrine, pulmonary, cardiac, gastrointestinal, genitourinary, musculoskeletal, integument and psychiatric systems are negative, except as otherwise noted.      Objective           Vitals:  No vitals were obtained today due to virtual visit.    Physical Exam   GENERAL: alert and no distress  EYES: Eyes grossly normal to inspection.  No discharge or erythema, or obvious scleral/conjunctival abnormalities.  RESP: No audible wheeze, cough, or visible cyanosis.    SKIN: Visible skin clear. No significant rash, abnormal pigmentation or lesions.  NEURO: Cranial nerves grossly intact.  Mentation and speech appropriate for age.  PSYCH: Appropriate affect, tone, and pace of words    Pending       Video-Visit Details    Type of service:  Video Visit   Originating Location (pt. Location): Home    Distant Location (provider location):  On-site  Platform used for Video Visit: sharri  Signed Electronically by: Ena Dukes MD    9:56 AM end visit  Start 9.47 AM

## 2025-03-06 NOTE — TELEPHONE ENCOUNTER
----- Message from Ena Dukes sent at 3/6/2025  2:04 PM CST -----  Please call  Diabetes Uncontrolled   We can increase Ozempic to 1 mg   Watch for side effects  If any GI side effects he needs to follow up   Let me Know if he wants to increase dose  Strict Diabetic diet  Ena Dukes MD

## 2025-03-06 NOTE — TELEPHONE ENCOUNTER
Attempt #1 to call patient.     RN left voicemail and requested return call to [facility] at [phone] and ask to speak to a nurse.     Ebony Garnica RN   [facility]

## 2025-03-10 ENCOUNTER — PATIENT OUTREACH (OUTPATIENT)
Dept: CARE COORDINATION | Facility: CLINIC | Age: 65
End: 2025-03-10
Payer: COMMERCIAL

## 2025-03-13 NOTE — TELEPHONE ENCOUNTER
Pt read MyChart message left by previous nurse. See note MyChart message from 3/12.     Time stamp of when pt read message:  Last Read in [place]  3/12/2025  3:06 PM by Rj Pink encounter. Will reopen if pt reaches out with any questions.     Ebony Garnica RN

## 2025-05-29 ENCOUNTER — OFFICE VISIT (OUTPATIENT)
Dept: FAMILY MEDICINE | Facility: CLINIC | Age: 65
End: 2025-05-29
Payer: COMMERCIAL

## 2025-05-29 ENCOUNTER — ORDERS ONLY (AUTO-RELEASED) (OUTPATIENT)
Dept: FAMILY MEDICINE | Facility: CLINIC | Age: 65
End: 2025-05-29

## 2025-05-29 VITALS
TEMPERATURE: 98.1 F | SYSTOLIC BLOOD PRESSURE: 134 MMHG | RESPIRATION RATE: 16 BRPM | HEIGHT: 62 IN | OXYGEN SATURATION: 97 % | HEART RATE: 45 BPM | DIASTOLIC BLOOD PRESSURE: 80 MMHG | WEIGHT: 171 LBS | BODY MASS INDEX: 31.47 KG/M2

## 2025-05-29 DIAGNOSIS — Z12.5 SCREENING FOR PROSTATE CANCER: ICD-10-CM

## 2025-05-29 DIAGNOSIS — R00.1 BRADYCARDIA: ICD-10-CM

## 2025-05-29 DIAGNOSIS — Z12.11 COLON CANCER SCREENING: ICD-10-CM

## 2025-05-29 DIAGNOSIS — Z00.00 ROUTINE GENERAL MEDICAL EXAMINATION AT A HEALTH CARE FACILITY: Primary | ICD-10-CM

## 2025-05-29 DIAGNOSIS — E11.9 TYPE 2 DIABETES MELLITUS WITHOUT COMPLICATION, WITHOUT LONG-TERM CURRENT USE OF INSULIN (H): ICD-10-CM

## 2025-05-29 DIAGNOSIS — I25.10 CORONARY ARTERY DISEASE INVOLVING NATIVE CORONARY ARTERY OF NATIVE HEART WITHOUT ANGINA PECTORIS: ICD-10-CM

## 2025-05-29 LAB
ERYTHROCYTE [DISTWIDTH] IN BLOOD BY AUTOMATED COUNT: 12.6 % (ref 10–15)
HCT VFR BLD AUTO: 52.9 % (ref 40–53)
HGB BLD-MCNC: 17 G/DL (ref 13.3–17.7)
MCH RBC QN AUTO: 28.2 PG (ref 26.5–33)
MCHC RBC AUTO-ENTMCNC: 32.1 G/DL (ref 31.5–36.5)
MCV RBC AUTO: 88 FL (ref 78–100)
PLATELET # BLD AUTO: 179 10E3/UL (ref 150–450)
RBC # BLD AUTO: 6.03 10E6/UL (ref 4.4–5.9)
WBC # BLD AUTO: 7.1 10E3/UL (ref 4–11)

## 2025-05-29 RX ORDER — METOPROLOL SUCCINATE 25 MG/1
25 TABLET, EXTENDED RELEASE ORAL DAILY
Qty: 90 TABLET | Refills: 3 | Status: SHIPPED | OUTPATIENT
Start: 2025-05-29

## 2025-05-29 RX ORDER — LANCETS 28 GAUGE
1 EACH MISCELLANEOUS 3 TIMES DAILY
Qty: 270 EACH | Refills: 3 | Status: SHIPPED | OUTPATIENT
Start: 2025-05-29

## 2025-05-29 SDOH — HEALTH STABILITY: PHYSICAL HEALTH: ON AVERAGE, HOW MANY DAYS PER WEEK DO YOU ENGAGE IN MODERATE TO STRENUOUS EXERCISE (LIKE A BRISK WALK)?: 5 DAYS

## 2025-05-29 SDOH — HEALTH STABILITY: PHYSICAL HEALTH: ON AVERAGE, HOW MANY MINUTES DO YOU ENGAGE IN EXERCISE AT THIS LEVEL?: 30 MIN

## 2025-05-29 ASSESSMENT — SOCIAL DETERMINANTS OF HEALTH (SDOH): HOW OFTEN DO YOU GET TOGETHER WITH FRIENDS OR RELATIVES?: TWICE A WEEK

## 2025-05-29 NOTE — PATIENT INSTRUCTIONS
Patient Education   Preventive Care Advice   This is general advice given by our system to help you stay healthy. However, your care team may have specific advice just for you. Please talk to your care team about your preventive care needs.  Nutrition  Eat 5 or more servings of fruits and vegetables each day.  Try wheat bread, brown rice and whole grain pasta (instead of white bread, rice, and pasta).  Get enough calcium and vitamin D. Check the label on foods and aim for 100% of the RDA (recommended daily allowance).  Lifestyle  Exercise at least 150 minutes each week  (30 minutes a day, 5 days a week).  Do muscle strengthening activities 2 days a week. These help control your weight and prevent disease.  No smoking.  Wear sunscreen to prevent skin cancer.  Have a dental exam and cleaning every 6 months.  Yearly exams  See your health care team every year to talk about:  Any changes in your health.  Any medicines your care team has prescribed.  Preventive care, family planning, and ways to prevent chronic diseases.  Shots (vaccines)   HPV shots (up to age 26), if you've never had them before.  Hepatitis B shots (up to age 59), if you've never had them before.  COVID-19 shot: Get this shot when it's due.  Flu shot: Get a flu shot every year.  Tetanus shot: Get a tetanus shot every 10 years.  Pneumococcal, hepatitis A, and RSV shots: Ask your care team if you need these based on your risk.  Shingles shot (for age 50 and up)  General health tests  Diabetes screening:  Starting at age 35, Get screened for diabetes at least every 3 years.  If you are younger than age 35, ask your care team if you should be screened for diabetes.  Cholesterol test: At age 39, start having a cholesterol test every 5 years, or more often if advised.  Bone density scan (DEXA): At age 50, ask your care team if you should have this scan for osteoporosis (brittle bones).  Hepatitis C: Get tested at least once in your life.  STIs (sexually  transmitted infections)  Before age 24: Ask your care team if you should be screened for STIs.  After age 24: Get screened for STIs if you're at risk. You are at risk for STIs (including HIV) if:  You are sexually active with more than one person.  You don't use condoms every time.  You or a partner was diagnosed with a sexually transmitted infection.  If you are at risk for HIV, ask about PrEP medicine to prevent HIV.  Get tested for HIV at least once in your life, whether you are at risk for HIV or not.  Cancer screening tests  Cervical cancer screening: If you have a cervix, begin getting regular cervical cancer screening tests starting at age 21.  Breast cancer scan (mammogram): If you've ever had breasts, begin having regular mammograms starting at age 40. This is a scan to check for breast cancer.  Colon cancer screening: It is important to start screening for colon cancer at age 45.  Have a colonoscopy test every 10 years (or more often if you're at risk) Or, ask your provider about stool tests like a FIT test every year or Cologuard test every 3 years.  To learn more about your testing options, visit:   .  For help making a decision, visit:   https://bit.ly/bl29686.  Prostate cancer screening test: If you have a prostate, ask your care team if a prostate cancer screening test (PSA) at age 55 is right for you.  Lung cancer screening: If you are a current or former smoker ages 50 to 80, ask your care team if ongoing lung cancer screenings are right for you.  For informational purposes only. Not to replace the advice of your health care provider. Copyright   2023 [employer]. All rights reserved. Clinically reviewed by the [facility] Transitions Program. [employer] [id number] - REV 01/24.

## 2025-05-29 NOTE — PROGRESS NOTES
Preventive Care Visit  [facility]  Isatu Locke MD, Family Medicine  May 29, 2025      Assessment & Plan     (Z00.00) Routine general medical examination at a health care facility  (primary encounter diagnosis)  Comment: encourage annual pe and vaccine up date  Plan: Comprehensive metabolic panel (BMP + Alb, Alk         Phos, ALT, AST, Total. Bili, TP), TSH with free        T4 reflex, Lipid panel reflex to direct LDL         Fasting, CBC with platelets            (E11.9) Type 2 diabetes mellitus without complication, without long-term current use of insulin (H)  Comment: A1c 8.4 3 month ago, diabetes poor controlled. Pt state he has done good diet control in past three month. He has used ozempic for 1 year. He was advised to discontinue metformin by pcp since he started ozempic. Home bs fasting around 100-120.   Plan: Easy Touch Lancets 28G MISC, Semaglutide, 1         MG/DOSE, (OZEMPIC) 4 MG/3ML pen        Will increase the dose of ozempic from 0.5 mg to 1 mg weekly. Follow-up in 3 month. Addressed dm diet control, exercise, eye care and foot care. Will consider to add metformin if A1c continue to be high.     (Z12.11) Colon cancer screening  Comment: pt declined colonoscope.   Plan: COLOGUARD([employer])            (Z12.5) Screening for prostate cancer  Comment:   Plan: PSA, screen            (I25.10) Coronary artery disease involving native coronary artery of native heart without angina pectoris  Comment: pt had Non-STEMI at 2014 and had 3 stents. He follow-uped with cardiologist but stopped several years ago. Non smoker. He takes lipitor and aspirin. No cp, sob, palpitation. He check bp at home sometimes but not remember the number. Pulse is low at office. Denies dizziness and light headed.   Plan: Adult Cardiology Eval  Referral        Advise to check bp and pulse at home regular. Will decrease the dose of metoprolol due to low pulse. He will have consult with  cardiologist.     (R00.1) Bradycardia  Comment: pulse low at office today. He does not check pulse at home. He is asymptomatic. Likely due to side effect of BB: metoprolol  Plan: decrease the dose of metoprolol from 50 to 25 mg daily and advise to check pulse several times a day. Follow-up in 2 weeks. Advise to hold the medication if pulse is < 60.    The longitudinal plan of care for the diagnosis(es)/condition(s) as documented were addressed during this visit. Due to the added complexity in care, I will continue to support Rj in the subsequent management and with ongoing continuity of care.    Patient has been advised of split billing requirements and indicates understanding: Yes        Counseling  Appropriate preventive services were addressed with this patient via screening, questionnaire, or discussion as appropriate for fall prevention, nutrition, physical activity, Tobacco-use cessation, social engagement, weight loss and cognition.  Checklist reviewing preventive services available has been given to the patient.  Reviewed patient's diet, addressing concerns and/or questions.   The patient was instructed to see the dentist every 6 months.       Follow-up   No follow-ups on file.     Follow-up Visit   Expected date:  May 29, 2026 (Approximate)      Follow Up Appointment Details:     Follow-up with whom?: PCP    Follow-Up for what?: Adult Preventive    How?: In Person                 Subjective   Rj is a 65 year old, presenting for the following:  Physical (fasting)        5/29/2025     7:35 AM   Additional Questions   Roomed by Gen ASHLEIGH CMA        HPI       Diabetes Follow-up    How often are you checking your blood sugar? One time daily  What time of day are you checking your blood sugars (select all that apply)?  Before meals  Have you had any blood sugars above 200?  No  Have you had any blood sugars below 70?  No  What symptoms do you notice when your blood sugar is low?  None  What concerns do you have  today about your diabetes? None   Do you have any of these symptoms? (Select all that apply)  No numbness or tingling in feet.  No redness, sores or blisters on feet.  No complaints of excessive thirst.  No reports of blurry vision.  No significant changes to weight.      BP Readings from Last 2 Encounters:   05/29/25 134/80   08/29/24 132/78     Hemoglobin A1C (%)   Date Value   03/06/2025 8.4 (H)   08/29/2024 6.8 (H)     LDL Cholesterol Calculated (mg/dL)   Date Value   08/29/2024 19   07/28/2022 47     LDL Cholesterol Direct (mg/dL)   Date Value   08/31/2012 59   04/25/2011 90             Vascular Disease Follow-up    How often do you take nitroglycerin? Never  Do you take an aspirin every day? Yes    Advance Care Planning    Discussed advance care planning with patient; however, patient declined at this time.        5/29/2025   General Health   How would you rate your overall physical health? Good   Feel stress (tense, anxious, or unable to sleep) Not at all         5/29/2025   Nutrition   Three or more servings of calcium each day? Yes   Diet: I don't know   How many servings of fruit and vegetables per day? (!) 2-3   How many sweetened beverages each day? 0-1         5/29/2025   Exercise   Days per week of moderate/strenous exercise 5 days   Average minutes spent exercising at this level 30 min         5/29/2025   Social Factors   Frequency of gathering with friends or relatives Twice a week   Worry food won't last until get money to buy more No   Food not last or not have enough money for food? No   Do you have housing? (Housing is defined as stable permanent housing and does not include staying outside in a car, in a tent, in an abandoned building, in an overnight shelter, or couch-surfing.) No   Are you worried about losing your housing? No   Lack of transportation? No   Unable to get utilities (heat,electricity)? No   Want help with housing or utility concern? No   (!) HOUSING CONCERN PRESENT       5/29/2025   Fall Risk   Fallen 2 or more times in the past year? No   Trouble with walking or balance? No          5/29/2025   Dental   Dentist two times every year? (!) NO           Today's PHQ-2 Score:       3/6/2025     9:19 AM   PHQ-2 ( 1999 [employer])   Q1: Little interest or pleasure in doing things 0    Q2: Feeling down, depressed or hopeless 0    PHQ-2 Score 0    Q1: Little interest or pleasure in doing things Not at all   Q2: Feeling down, depressed or hopeless Not at all   PHQ-2 Score 0       Proxy-reported         5/29/2025   Substance Use   Alcohol more than 3/day or more than 7/wk No   Do you use any other substances recreationally? No     Social History     Tobacco Use    Smoking status: Never    Smokeless tobacco: Never   Vaping Use    Vaping status: Never Used   Substance Use Topics    Alcohol use: Yes     Comment: Alcoholic Drinks/day: once in a few months    Drug use: No           5/29/2025   STI Screening   New sexual partner(s) since last STI/HIV test? No   Last PSA:   Prostate Specific Antigen Screen   Date Value Ref Range Status   08/31/2012 0.5 <3.6 ng/mL Final     Comment:     Method is Abbott Prostate-Specific Antigen (PSA)            Standard-WHO 1st International (90:10) as of 09/26/05       ASCVD Risk   The ASCVD Risk score (Ramonita MATA, et al., 2019) failed to calculate for the following reasons:    Risk score cannot be calculated because patient has a medical history suggesting prior/existing ASCVD           Reviewed and updated as needed this visit by Provider   Tobacco  Allergies  Meds  Problems  Med Hx  Surg Hx  Fam Hx            Lab work is in process  Labs reviewed in EPIC      Review of Systems  Constitutional, HEENT, cardiovascular, pulmonary, GI, , musculoskeletal, neuro, skin, endocrine and psych systems are negative, except as otherwise noted.     Objective    Exam  /80 (BP Location: Right arm, Patient Position: Sitting, Cuff Size: Adult Regular)   Pulse  "(!) 45   Temp 98.1  F (36.7  C) (Oral)   Resp 16   Ht 1.575 m (5' 2\")   Wt 77.6 kg (171 lb)   SpO2 97%   BMI 31.28 kg/m     Estimated body mass index is 31.28 kg/m  as calculated from the following:    Height as of this encounter: 1.575 m (5' 2\").    Weight as of this encounter: 77.6 kg (171 lb).    Physical Exam  GENERAL: alert and no distress  EYES: Eyes grossly normal to inspection, PERRL and conjunctivae and sclerae normal  HENT: ear canals and TM's normal, nose and mouth without ulcers or lesions  NECK: no adenopathy, no asymmetry, masses, or scars  RESP: lungs clear to auscultation - no rales, rhonchi or wheezes  CV: regular rate and rhythm, normal S1 S2, no S3 or S4, no murmur, click or rub, no peripheral edema  ABDOMEN: soft, nontender, no hepatosplenomegaly, no masses and bowel sounds normal  MS: no gross musculoskeletal defects noted, no edema  SKIN: no suspicious lesions or rashes  NEURO: Normal strength and tone, mentation intact and speech normal  PSYCH: mentation appears normal, affect normal/bright  Foot exam - both sides normal; no swelling, tenderness or skin or vascular lesions. Color and temperature is normal. Sensation is intact. Peripheral pulses are palpable.        Signed Electronically by: Isatu Locke MD    "

## 2025-06-02 ENCOUNTER — TELEPHONE (OUTPATIENT)
Dept: FAMILY MEDICINE | Facility: CLINIC | Age: 65
End: 2025-06-02
Payer: COMMERCIAL

## 2025-06-02 ENCOUNTER — PATIENT OUTREACH (OUTPATIENT)
Dept: CARE COORDINATION | Facility: CLINIC | Age: 65
End: 2025-06-02
Payer: COMMERCIAL

## 2025-06-02 ENCOUNTER — RESULTS FOLLOW-UP (OUTPATIENT)
Dept: FAMILY MEDICINE | Facility: CLINIC | Age: 65
End: 2025-06-02

## 2025-06-02 DIAGNOSIS — E11.9 TYPE 2 DIABETES MELLITUS WITHOUT COMPLICATION, WITHOUT LONG-TERM CURRENT USE OF INSULIN (H): ICD-10-CM

## 2025-06-02 RX ORDER — LANCETS 28 GAUGE
1 EACH MISCELLANEOUS 3 TIMES DAILY
Qty: 270 EACH | Refills: 3 | Status: SHIPPED | OUTPATIENT
Start: 2025-06-02

## 2025-06-02 NOTE — TELEPHONE ENCOUNTER
Easy Touch Lancets 28G MISC please update to 300 per packaging and it is not a strip  and not by mouth

## 2025-06-12 ENCOUNTER — RESULTS FOLLOW-UP (OUTPATIENT)
Dept: FAMILY MEDICINE | Facility: CLINIC | Age: 65
End: 2025-06-12

## 2025-06-12 ENCOUNTER — OFFICE VISIT (OUTPATIENT)
Dept: FAMILY MEDICINE | Facility: CLINIC | Age: 65
End: 2025-06-12
Payer: COMMERCIAL

## 2025-06-12 VITALS
BODY MASS INDEX: 31.1 KG/M2 | WEIGHT: 169 LBS | RESPIRATION RATE: 16 BRPM | OXYGEN SATURATION: 98 % | TEMPERATURE: 98.7 F | DIASTOLIC BLOOD PRESSURE: 80 MMHG | SYSTOLIC BLOOD PRESSURE: 140 MMHG | HEIGHT: 62 IN | HEART RATE: 67 BPM

## 2025-06-12 DIAGNOSIS — H11.002 PTERYGIUM OF LEFT EYE: ICD-10-CM

## 2025-06-12 DIAGNOSIS — E11.9 TYPE 2 DIABETES MELLITUS WITHOUT COMPLICATION, WITHOUT LONG-TERM CURRENT USE OF INSULIN (H): Primary | ICD-10-CM

## 2025-06-12 DIAGNOSIS — I10 ESSENTIAL HYPERTENSION: ICD-10-CM

## 2025-06-12 LAB
EST. AVERAGE GLUCOSE BLD GHB EST-MCNC: 186 MG/DL
HBA1C MFR BLD: 8.1 % (ref 0–5.6)

## 2025-06-12 RX ORDER — LOSARTAN POTASSIUM 25 MG/1
25 TABLET ORAL DAILY
Qty: 90 TABLET | Refills: 3 | Status: SHIPPED | OUTPATIENT
Start: 2025-06-12

## 2025-06-12 NOTE — PROGRESS NOTES
Assessment & Plan     (E11.9) Type 2 diabetes mellitus without complication, without long-term current use of insulin (H)  (primary encounter diagnosis)  Comment: A1c 8.1 diabetes poor control.   Plan: Semaglutide, 1 MG/DOSE, (OZEMPIC) 4 MG/3ML pen,        Hemoglobin A1c        Continue current medication with increased ozempic to 1 mg weekly. Continue diet control. Follow-up in 3 month.     (I10) Essential hypertension  Comment: bp elevated.  pt discontinued metoprolol due to pulse low 40-50. After that pulse around 70. Home bp 130-140/70-80. Denies cp, sob, palpitation, headache and blurry vision.   Plan: losartan (COZAAR) 25 MG tablet        Will start losartan 25 mg for good bp control. Continue home bp monitor. If bp is < 100/60 he will hold it. If bp average  > 140/80 he will follow-up for medication adjustment.      (H11.002) Pterygium of left eye  Comment: pt has a growth in the left eye. He denies vision change. It is pterygium,   Plan: advise pt to have annual eye exam with eye provider.     The longitudinal plan of care for the diagnosis(es)/condition(s) as documented were addressed during this visit. Due to the added complexity in care, I will continue to support Rj in the subsequent management and with ongoing continuity of care.          Follow-up   No follow-ups on file.        Subjective   Rj is a 65 year old, presenting for the following health issues:  RECHECK (2 week follow up)        6/12/2025    11:43 AM   Additional Questions   Roomed by Gen ASHLEIGH CMA   Accompanied by spouse     History of Present Illness       Vascular Disease:  He presents for follow up of vascular disease.      He takes daily aspirin.    He eats 2-3 servings of fruits and vegetables daily.He consumes 1 sweetened beverage(s) daily.He exercises with enough effort to increase his heart rate 20 to 29 minutes per day.  He exercises with enough effort to increase his heart rate 5 days per week.   He is taking medications  "regularly.         Diabetes Follow-up    How often are you checking your blood sugar? A few times a week  What time of day are you checking your blood sugars (select all that apply)?  Before meals  Have you had any blood sugars above 200?  No  Have you had any blood sugars below 70?  No  What symptoms do you notice when your blood sugar is low?  None  What concerns do you have today about your diabetes? None   Do you have any of these symptoms? (Select all that apply)  No numbness or tingling in feet.  No redness, sores or blisters on feet.  No complaints of excessive thirst.  No reports of blurry vision.  No significant changes to weight.      BP Readings from Last 2 Encounters:   06/12/25 (!) 140/80   05/29/25 134/80     Hemoglobin A1C (%)   Date Value   03/06/2025 8.4 (H)   08/29/2024 6.8 (H)     LDL Cholesterol Calculated (mg/dL)   Date Value   05/29/2025 44   08/29/2024 19     LDL Cholesterol Direct (mg/dL)   Date Value   08/31/2012 59   04/25/2011 90             Hypertension Follow-up    Do you check your blood pressure regularly outside of the clinic? Yes   Are you following a low salt diet? Yes  Are your blood pressures ever more than 140 on the top number (systolic) OR more   than 90 on the bottom number (diastolic), for example 140/90? Yes    BP Readings from Last 2 Encounters:   06/12/25 (!) 140/80   05/29/25 134/80          Review of Systems  Constitutional, HEENT, cardiovascular, pulmonary, gi and gu systems are negative, except as otherwise noted.      Objective    BP (!) 140/80 (BP Location: Right arm, Patient Position: Sitting, Cuff Size: Adult Regular)   Pulse 67   Temp 98.7  F (37.1  C) (Oral)   Resp 16   Ht 1.575 m (5' 2\")   Wt 76.7 kg (169 lb)   SpO2 98%   BMI 30.91 kg/m    Body mass index is 30.91 kg/m .  Physical Exam   GENERAL: alert and no distress  Eye: left eye: a yellowish raised fleshy growth on conjunctiva grow into the cornea. .   NECK: no adenopathy, no asymmetry, masses, or " scars  RESP: lungs clear to auscultation - no rales, rhonchi or wheezes  CV: regular rate and rhythm, normal S1 S2, no S3 or S4, no murmur, click or rub, no peripheral edema  ABDOMEN: soft, nontender, no hepatosplenomegaly, no masses and bowel sounds normal  MS: no gross musculoskeletal defects noted, no edema           Signed Electronically by: Isatu Locke MD

## 2025-06-30 ENCOUNTER — OFFICE VISIT (OUTPATIENT)
Dept: CARDIOLOGY | Facility: CLINIC | Age: 65
End: 2025-06-30
Payer: COMMERCIAL

## 2025-06-30 VITALS
SYSTOLIC BLOOD PRESSURE: 137 MMHG | DIASTOLIC BLOOD PRESSURE: 83 MMHG | RESPIRATION RATE: 14 BRPM | BODY MASS INDEX: 31.83 KG/M2 | HEART RATE: 80 BPM | WEIGHT: 174 LBS

## 2025-06-30 DIAGNOSIS — I25.10 CORONARY ARTERY DISEASE INVOLVING NATIVE CORONARY ARTERY OF NATIVE HEART WITHOUT ANGINA PECTORIS: Primary | ICD-10-CM

## 2025-06-30 PROCEDURE — G2211 COMPLEX E/M VISIT ADD ON: HCPCS | Performed by: STUDENT IN AN ORGANIZED HEALTH CARE EDUCATION/TRAINING PROGRAM

## 2025-06-30 PROCEDURE — 93000 ELECTROCARDIOGRAM COMPLETE: CPT | Performed by: INTERNAL MEDICINE

## 2025-06-30 PROCEDURE — 3075F SYST BP GE 130 - 139MM HG: CPT | Performed by: STUDENT IN AN ORGANIZED HEALTH CARE EDUCATION/TRAINING PROGRAM

## 2025-06-30 PROCEDURE — 3079F DIAST BP 80-89 MM HG: CPT | Performed by: STUDENT IN AN ORGANIZED HEALTH CARE EDUCATION/TRAINING PROGRAM

## 2025-06-30 PROCEDURE — 99204 OFFICE O/P NEW MOD 45 MIN: CPT | Performed by: STUDENT IN AN ORGANIZED HEALTH CARE EDUCATION/TRAINING PROGRAM

## 2025-06-30 NOTE — LETTER
6/30/2025    Elda Cheung, NP  [address] [facility] [address]    RE: Rj Pink       Dear Colleague,     I had the pleasure of seeing Rj Pink in the [facility].  Cardiology Clinic    Rj Pink is a 65 year old with a history of coronary artery disease status post PCI in 2015, hypertension, type 2 diabetes who presents for evaluation of bradycardia.    Rj was seeing his primary care doctor on June 12 where he was found to have bradycardia.  Given this, he was referred here for further evaluation.  Rj denies any shortness of breath, palpitations, syncope, or presyncope.  He does not exercise, however he does go for walks around the block a few times a week and has no issues doing this.  He has had no change in his exertional tolerance.    General: Well appearing, appears stated age.  CV: Normal rate and rhythm. No m/r/g. No JVD.   Pulm: Normal effort. Normal breath sounds.  Lower extremities: No lower extremity edema.    Labs:   Reviewed in epic.  Creatinine 1.29  A1c 8.1%  LDL 44  Hemoglobin 17  Platelets 179  Hemoglobin 17  Platelets 179    Testing:  Coronary angiogram 2017: Minimal ISR of the LAD stent, OM1 with mild disease.  (My interpretation)  EKG June 30, 2025: Sinus bradycardia with sinus arrhythmia, left axis deviation, LVH (my interpretation)    Assessment and Plan:    1.  Bradycardia.  No symptoms, no need for further testing.    2.  Hypertension.  Blood pressure above goal today, however on home readings he is generally around 110-120 systolic.  No changes today.    3.  Hyperlipidemia.  LDL goal less than 55, most recent 44.  Continue current regimen.    The longitudinal plan of care for the diagnosis(es)/condition(s) as documented were addressed during this visit. Due to the added complexity in care, I will continue to support Rj in the subsequent management and with ongoing continuity of care.    Return in about 1 year (around 6/30/2026).    David Byers  MD  Interventional Cardiology    Thank you for allowing me to participate in the care of your patient.      Sincerely,     David Byers MD     [facility]  cc:   David Byers MD  [address] [place] [id number]

## 2025-06-30 NOTE — PROGRESS NOTES
Cardiology Clinic    Rj Pink is a 65 year old with a history of coronary artery disease status post PCI in 2015, hypertension, type 2 diabetes who presents for evaluation of bradycardia.    Rj was seeing his primary care doctor on June 12 where he was found to have bradycardia.  Given this, he was referred here for further evaluation.  Rj denies any shortness of breath, palpitations, syncope, or presyncope.  He does not exercise, however he does go for walks around the block a few times a week and has no issues doing this.  He has had no change in his exertional tolerance.    General: Well appearing, appears stated age.  CV: Normal rate and rhythm. No m/r/g. No JVD.   Pulm: Normal effort. Normal breath sounds.  Lower extremities: No lower extremity edema.    Labs:   Reviewed in epic.  Creatinine 1.29  A1c 8.1%  LDL 44  Hemoglobin 17  Platelets 179  Hemoglobin 17  Platelets 179    Testing:  Coronary angiogram 2017: Minimal ISR of the LAD stent, OM1 with mild disease.  (My interpretation)  EKG June 30, 2025: Sinus bradycardia with sinus arrhythmia, left axis deviation, LVH (my interpretation)    Assessment and Plan:    1.  Bradycardia.  No symptoms, no need for further testing.    2.  Hypertension.  Blood pressure above goal today, however on home readings he is generally around 110-120 systolic.  No changes today.    3.  Hyperlipidemia.  LDL goal less than 55, most recent 44.  Continue current regimen.    The longitudinal plan of care for the diagnosis(es)/condition(s) as documented were addressed during this visit. Due to the added complexity in care, I will continue to support Rj in the subsequent management and with ongoing continuity of care.    Return in about 1 year (around 6/30/2026).    David Byers MD  Interventional Cardiology

## 2025-06-30 NOTE — PATIENT INSTRUCTIONS
Goal blood pressure <130/80 mmHg, yours was 137/83 today    Goal cholesterol LDL <55 mg/dL, yours was 44.    HR looks great today, especially if you are not having symptoms, no need for further testing.

## 2025-07-02 LAB
ATRIAL RATE - MUSE: 52 BPM
DIASTOLIC BLOOD PRESSURE - MUSE: NORMAL MMHG
INTERPRETATION ECG - MUSE: NORMAL
P AXIS - MUSE: -3 DEGREES
PR INTERVAL - MUSE: 180 MS
QRS DURATION - MUSE: 132 MS
QT - MUSE: 434 MS
QTC - MUSE: 403 MS
R AXIS - MUSE: -58 DEGREES
SYSTOLIC BLOOD PRESSURE - MUSE: NORMAL MMHG
T AXIS - MUSE: -27 DEGREES
VENTRICULAR RATE- MUSE: 52 BPM